# Patient Record
Sex: MALE | Race: WHITE | NOT HISPANIC OR LATINO | Employment: UNEMPLOYED | URBAN - METROPOLITAN AREA
[De-identification: names, ages, dates, MRNs, and addresses within clinical notes are randomized per-mention and may not be internally consistent; named-entity substitution may affect disease eponyms.]

---

## 2019-02-05 ENCOUNTER — HOSPITAL ENCOUNTER (EMERGENCY)
Facility: HOSPITAL | Age: 29
Discharge: HOME | End: 2019-02-05
Attending: EMERGENCY MEDICINE

## 2019-02-05 VITALS
BODY MASS INDEX: 23.02 KG/M2 | WEIGHT: 151.88 LBS | OXYGEN SATURATION: 99 % | HEART RATE: 128 BPM | HEIGHT: 68 IN | RESPIRATION RATE: 16 BRPM | TEMPERATURE: 99 F | DIASTOLIC BLOOD PRESSURE: 87 MMHG | SYSTOLIC BLOOD PRESSURE: 134 MMHG

## 2019-02-05 DIAGNOSIS — R19.7 VOMITING AND DIARRHEA: Primary | ICD-10-CM

## 2019-02-05 DIAGNOSIS — R10.32 LLQ ABDOMINAL PAIN: ICD-10-CM

## 2019-02-05 DIAGNOSIS — R11.10 VOMITING AND DIARRHEA: Primary | ICD-10-CM

## 2019-02-05 PROCEDURE — 99283 EMERGENCY DEPT VISIT LOW MDM: CPT

## 2019-02-05 RX ORDER — ONDANSETRON 4 MG/1
4 TABLET, ORALLY DISINTEGRATING ORAL EVERY 8 HOURS PRN
Qty: 12 TABLET | Refills: 0 | Status: ON HOLD | OUTPATIENT
Start: 2019-02-05 | End: 2020-10-09 | Stop reason: HOSPADM

## 2019-02-05 NOTE — ED PROVIDER NOTES
"Encounter Date: 2/5/2019    SCRIBE #1 NOTE: I, Kaitlynn Carrasco, am scribing for, and in the presence of, Xavier Burton MD.       History     Chief Complaint   Patient presents with    Diarrhea     cough       Time seen by provider: 7:27 AM on 02/05/2019    Jabier Haider Jr. is a 28 y.o. male who presents to the ED with an onset of nausea, vomiting, and diarrhea. He began to sweat and felt feverish but did not take his temperature 2 nights ago and endorsed watery stool. The patient woke up vomiting yesterday morning and stayed home from work. Upon waking up this morning, he has endorsed an intermittent "hacking" cough. 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 He denies taking daily medications, having an established PCP, sick contacts, abdominal pain, blood in stool or urine, difficulty urinating, testicular pain, or any other symptoms at this time. No abdominal SHx noted. No known drug allergies noted.      The history is provided by the patient.     Review of patient's allergies indicates:   Allergen Reactions    Latex, natural rubber      History reviewed. No pertinent past medical history.  Past Surgical History:   Procedure Laterality Date    DENTAL SURGERY      ear      tubes     History reviewed. No pertinent family history.  Social History     Tobacco Use    Smoking status: Current Every Day Smoker     Packs/day: 1.00   Substance Use Topics    Alcohol use: No    Drug use: Not on file     Review of Systems   Constitutional: Positive for diaphoresis and fever (subjective).   HENT: Negative for nosebleeds.    Eyes: Negative for visual disturbance.   Respiratory: Positive for cough (productive, intermittent).    Cardiovascular: Negative for chest pain and palpitations.   Gastrointestinal: Positive for diarrhea, nausea and vomiting. Negative for " abdominal pain and blood in stool.   Genitourinary: Negative for difficulty urinating, hematuria and testicular pain.   Musculoskeletal: Negative for back pain and neck pain.   Skin: Negative for rash.   Neurological: Negative for seizures, syncope and headaches.     Physical Exam     Initial Vitals [02/05/19 0722]   BP Pulse Resp Temp SpO2   134/87 (!) 128 16 98.6 °F (37 °C) 99 %      MAP       --         Physical Exam    Nursing note and vitals reviewed.  Constitutional: He appears well-developed and well-nourished. He is not diaphoretic. No distress.   HENT:   Head: Normocephalic and atraumatic.   Mouth/Throat: Oropharynx is clear and moist and mucous membranes are normal.   Mucous membranes are moist.    Eyes: Conjunctivae are normal. No scleral icterus.   Neck: Neck supple.   Cardiovascular: Regular rhythm, normal heart sounds and intact distal pulses. Tachycardia present.  Exam reveals no gallop and no friction rub.    No murmur heard.  Pulmonary/Chest: Breath sounds normal. He has no wheezes. He has no rhonchi. He has no rales.   Abdominal: Soft. He exhibits no distension and no mass. There is tenderness in the left lower quadrant. There is guarding. There is no rebound. No hernia.   Focal LLQ tenderness with moderate voluntary guarding. No involuntary guarding.    Musculoskeletal: He exhibits no edema.   No peripheral edema.    Neurological: He is alert and oriented to person, place, and time.   Normal gait.   Skin: Capillary refill takes less than 2 seconds. No rash noted. No erythema.   Psychiatric: He has a normal mood and affect. Thought content normal.       ED Course   Procedures  Labs Reviewed - No data to display     Imaging Results    None          Medical Decision Making:   History:   Old Medical Records: I decided to obtain old medical records.            Scribe Attestation:   Scribe #1: I performed the above scribed service and the documentation accurately describes the services I performed. I  "attest to the accuracy of the note.    I, Dr. Xavier Burton, personally performed the services described in this documentation. All medical record entries made by the scribe were at my direction and in my presence.  I have reviewed the chart and agree that the record reflects my personal performance and is accurate and complete. Xavier Burton MD.  8:47 AM 02/05/2019    Jabier Haider Jr. is a 28 y.o. male presenting with several days of vomiting and nonbloody diarrhea in the setting of tachycardia noted here as well as left lower quadrant abdominal pain on palpation.  He denies significant subjective abdominal pain. I did explain the issues with possible emergent intra-abdominal process such as abscess, colitis, diverticulitis, obstruction.  I did recommend further laboratories in view of noted features as well as imaging.  I discussed the possibility of worsening and possibly life-threatening process leading to death or permanent disability.  Patient is not altered and is able to repeat important features back in his own words.  He has the opportunity to ask questions.  He understands he may return at any point and is recommended close outpatient follow-up for reassessment.  Work note provided as requested since viral infectious process is possible.  Low suspicion for bacterial infectious process based on available information.  Ondansetron as necessary for nausea prescribed with oral rehydration reviewed for home.  Detailed return precautions reviewed as well. Patient advised to have friends or family observe at home pending follow-up.  He is discharged against medical advice as he wishes no additional testing or treatment citing "insurance issues."             Clinical Impression:   The primary encounter diagnosis was Vomiting and diarrhea. A diagnosis of LLQ abdominal pain was also pertinent to this visit.      Disposition:   Disposition: ILDEFONSO Burton, " MD  02/05/19 0850

## 2019-02-05 NOTE — LETTER
Patient: Jabier Haider  YOB: 1990  Date: 2/5/2019 Time: 7:42 AM  Location: [facility]    Leaving the [facility] Against Medical Advice    Chart #:[id number]    This will certify that I, the undersigned,    ______________________________________________________________________    A patient in the above named medical center, having requested discharge and removal from the medical [place] against the advice of my attending physician(s), hereby release [facility], its physicians, officers and employees, severally and individually, from any and all liability of any nature whatsoever for any injury or harm or complication of any kind that may result directly or indirectly, by reason of my terminating my stay as a patient at [facility] and my departure from [facility], and hereby waive any and all rights of action I may now have or later acquire as a result of my voluntary departure from [facility] and the termination of my stay as a patient therein.    This release is made with the full knowledge of the danger that may result from the action which I am taking.      Date:_______________________                         ___________________________                                                                                    Patient/Legal Representative    Witness:        ____________________________                          ___________________________  Nurse                                                                        Physician

## 2020-01-23 ENCOUNTER — HOSPITAL ENCOUNTER (EMERGENCY)
Facility: HOSPITAL | Age: 30
Discharge: HOME | End: 2020-01-23
Attending: EMERGENCY MEDICINE

## 2020-01-23 VITALS
OXYGEN SATURATION: 100 % | HEART RATE: 102 BPM | BODY MASS INDEX: 21.98 KG/M2 | WEIGHT: 145 LBS | HEIGHT: 68 IN | DIASTOLIC BLOOD PRESSURE: 88 MMHG | SYSTOLIC BLOOD PRESSURE: 148 MMHG | TEMPERATURE: 97 F

## 2020-01-23 DIAGNOSIS — R07.89 ATYPICAL CHEST PAIN: Primary | ICD-10-CM

## 2020-01-23 DIAGNOSIS — R07.9 CHEST PAIN: ICD-10-CM

## 2020-01-23 LAB
ALBUMIN SERPL BCP-MCNC: 4.8 G/DL (ref 3.5–5.2)
ALP SERPL-CCNC: 77 U/L (ref 55–135)
ALT SERPL W/O P-5'-P-CCNC: 15 U/L (ref 10–44)
ANION GAP SERPL CALC-SCNC: 11 MMOL/L (ref 8–16)
AST SERPL-CCNC: 18 U/L (ref 10–40)
BASOPHILS # BLD AUTO: 0.04 K/UL (ref 0–0.2)
BASOPHILS NFR BLD: 0.4 % (ref 0–1.9)
BILIRUB SERPL-MCNC: 0.5 MG/DL (ref 0.1–1)
BNP SERPL-MCNC: <10 PG/ML (ref 0–99)
BUN SERPL-MCNC: 15 MG/DL (ref 6–20)
CALCIUM SERPL-MCNC: 9.7 MG/DL (ref 8.7–10.5)
CHLORIDE SERPL-SCNC: 105 MMOL/L (ref 95–110)
CO2 SERPL-SCNC: 27 MMOL/L (ref 23–29)
CREAT SERPL-MCNC: 0.9 MG/DL (ref 0.5–1.4)
DIFFERENTIAL METHOD: ABNORMAL
EOSINOPHIL # BLD AUTO: 0.1 K/UL (ref 0–0.5)
EOSINOPHIL NFR BLD: 1.1 % (ref 0–8)
ERYTHROCYTE [DISTWIDTH] IN BLOOD BY AUTOMATED COUNT: 11.9 % (ref 11.5–14.5)
EST. GFR  (AFRICAN AMERICAN): >60 ML/MIN/1.73 M^2
EST. GFR  (NON AFRICAN AMERICAN): >60 ML/MIN/1.73 M^2
GLUCOSE SERPL-MCNC: 102 MG/DL (ref 70–110)
HCT VFR BLD AUTO: 45.2 % (ref 40–54)
HGB BLD-MCNC: 15.6 G/DL (ref 14–18)
IMM GRANULOCYTES # BLD AUTO: 0.02 K/UL (ref 0–0.04)
LYMPHOCYTES # BLD AUTO: 2.8 K/UL (ref 1–4.8)
LYMPHOCYTES NFR BLD: 28.5 % (ref 18–48)
MCH RBC QN AUTO: 32.2 PG (ref 27–31)
MCHC RBC AUTO-ENTMCNC: 34.5 G/DL (ref 32–36)
MCV RBC AUTO: 93 FL (ref 82–98)
MONOCYTES # BLD AUTO: 0.6 K/UL (ref 0.3–1)
MONOCYTES NFR BLD: 6.4 % (ref 4–15)
NEUTROPHILS # BLD AUTO: 6.3 K/UL (ref 1.8–7.7)
NEUTROPHILS NFR BLD: 63.4 % (ref 38–73)
NRBC BLD-RTO: 0 /100 WBC
PLATELET # BLD AUTO: 234 K/UL (ref 150–350)
PMV BLD AUTO: 8.8 FL (ref 9.2–12.9)
POTASSIUM SERPL-SCNC: 3.7 MMOL/L (ref 3.5–5.1)
PROT SERPL-MCNC: 7.6 G/DL (ref 6–8.4)
RBC # BLD AUTO: 4.85 M/UL (ref 4.6–6.2)
SODIUM SERPL-SCNC: 143 MMOL/L (ref 136–145)
TROPONIN I SERPL DL<=0.01 NG/ML-MCNC: 0.01 NG/ML (ref 0–0.03)
WBC # BLD AUTO: 9.92 K/UL (ref 3.9–12.7)

## 2020-01-23 PROCEDURE — 63600175 PHARM REV CODE 636 W HCPCS: Performed by: EMERGENCY MEDICINE

## 2020-01-23 PROCEDURE — 36415 COLL VENOUS BLD VENIPUNCTURE: CPT

## 2020-01-23 PROCEDURE — 96372 THER/PROPH/DIAG INJ SC/IM: CPT

## 2020-01-23 PROCEDURE — 83880 ASSAY OF NATRIURETIC PEPTIDE: CPT

## 2020-01-23 PROCEDURE — 80053 COMPREHEN METABOLIC PANEL: CPT

## 2020-01-23 PROCEDURE — 85025 COMPLETE CBC W/AUTO DIFF WBC: CPT

## 2020-01-23 PROCEDURE — 84484 ASSAY OF TROPONIN QUANT: CPT

## 2020-01-23 PROCEDURE — 93005 ELECTROCARDIOGRAM TRACING: CPT

## 2020-01-23 PROCEDURE — 99285 EMERGENCY DEPT VISIT HI MDM: CPT | Mod: 25

## 2020-01-23 RX ORDER — DICLOFENAC SODIUM 50 MG/1
50 TABLET, DELAYED RELEASE ORAL 3 TIMES DAILY PRN
Qty: 15 TABLET | Refills: 0 | Status: ON HOLD | OUTPATIENT
Start: 2020-01-23 | End: 2020-10-09 | Stop reason: HOSPADM

## 2020-01-23 RX ORDER — KETOROLAC TROMETHAMINE 30 MG/ML
10 INJECTION, SOLUTION INTRAMUSCULAR; INTRAVENOUS
Status: COMPLETED | OUTPATIENT
Start: 2020-01-23 | End: 2020-01-23

## 2020-01-23 RX ADMIN — KETOROLAC TROMETHAMINE 10 MG: 30 INJECTION, SOLUTION INTRAMUSCULAR at 11:01

## 2020-01-24 NOTE — ED PROVIDER NOTES
Encounter Date: 1/23/2020    SCRIBE #1 NOTE: IRip am scribing for, and in the presence of, Jacoby Young MD.       History     Chief Complaint   Patient presents with    Chest Pain     intermittent midsternal chest pain x 2-3 days        Time seen by provider: 9:19 PM on 01/23/2020    Jabier Haider Jr. is a 29 y.o. male who presents to the ED with an onset of intermittent middle and right-sided chest pain lasting for two to three days and worsening since this morning. He reports that his pain radiates downward and to the left. Pt also describes the pain as worsening at work. He denies any other sx at this time, including cough, shortness or breath, or bloody stool. No cardiopulmonary PMHx or PSHx. SHx of active work, sheeting for bulkheads. NKDA.    The history is provided by the patient.     Review of patient's allergies indicates:   Allergen Reactions    Latex, natural rubber      No past medical history on file.  Past Surgical History:   Procedure Laterality Date    DENTAL SURGERY      ear      tubes     History reviewed. No pertinent family history.  Social History     Tobacco Use    Smoking status: Current Every Day Smoker     Packs/day: 1.00   Substance Use Topics    Alcohol use: No    Drug use: Not on file     Review of Systems   Constitutional: Negative for fever.   HENT: Negative for sore throat.    Eyes: Negative for visual disturbance.   Respiratory: Negative for cough and shortness of breath.    Cardiovascular: Positive for chest pain.   Gastrointestinal: Negative for blood in stool and nausea.   Genitourinary: Negative for dysuria.   Musculoskeletal: Negative for back pain.   Skin: Negative for rash.   Neurological: Negative for weakness.       Physical Exam     Initial Vitals [01/23/20 2109]   BP Pulse Resp Temp SpO2   (!) 148/88 102 -- 97.1 °F (36.2 °C) 99 %      MAP       --         Physical Exam    Nursing note and vitals reviewed.  Constitutional: He appears  well-developed and well-nourished. He is not diaphoretic. No distress.   HENT:   Head: Normocephalic and atraumatic.   Eyes: EOM are normal. Pupils are equal, round, and reactive to light.   Neck: Normal range of motion. Neck supple.   Cardiovascular: Normal rate, regular rhythm, normal heart sounds and intact distal pulses. Exam reveals no gallop and no friction rub.    No murmur heard.  Regular rate and rhythm. Heart sounds normal without rubs, gallops, or murmurs.   Pulmonary/Chest: Breath sounds normal. No respiratory distress. He has no wheezes. He has no rhonchi. He has no rales.   Abdominal: Soft. Bowel sounds are normal. There is no tenderness. There is no rebound and no guarding.   Musculoskeletal: Normal range of motion.   Neurological: He is alert and oriented to person, place, and time.   Skin: Skin is warm.   Psychiatric: He has a normal mood and affect. His behavior is normal. Judgment and thought content normal.         ED Course   Procedures  Labs Reviewed   CBC W/ AUTO DIFFERENTIAL - Abnormal; Notable for the following components:       Result Value    Mean Corpuscular Hemoglobin 32.2 (*)     MPV 8.8 (*)     All other components within normal limits   COMPREHENSIVE METABOLIC PANEL   B-TYPE NATRIURETIC PEPTIDE   TROPONIN I     EKG Readings: (Independently Interpreted)   Sinus rhythm at rate of 95 bpm with normal axis and normal intervals. No acute ST segment changes.        Imaging Results          X-Ray Chest PA And Lateral (Final result)  Result time 01/23/20 21:45:32    Final result by Sebas Foreman MD (01/23/20 21:45:32)                 Impression:      No acute process.      Electronically signed by: Sebas Foreman MD  Date:    01/23/2020  Time:    21:45             Narrative:    EXAMINATION:  XR CHEST PA AND LATERAL    CLINICAL HISTORY:  Chest pain, unspecified    TECHNIQUE:  PA and lateral views of the chest were performed.    COMPARISON:  None    FINDINGS:  The trachea is unremarkable.  The  cardiomediastinal silhouette is within normal limits.  The hilar structures are unremarkable.  The hemidiaphragms are within normal limits.  There is no evidence of free air beneath the hemidiaphragms.  There are no pleural effusions.  There is no evidence of a pneumothorax.  There is no evidence of pneumomediastinum.  No airspace opacity is present.  The osseous structures are unremarkable.  The subcutaneous tissues are within normal limits.                                 Medical Decision Making:   Initial Assessment:   29-year-old male presented with chest pain.  Differential Diagnosis:   Initial differential diagnosis included but not limited to cardiac arrhythmia, chest wall pain, and atypical MI.  Clinical Tests:   Lab Tests: Ordered and Reviewed  Radiological Study: Ordered and Reviewed  Medical Tests: Ordered and Reviewed  ED Management:  The patient was emergently evaluated in the emergency department, his evaluation was significant for a young male with a normal lung exam.  The patient's EKG showed no acute abnormalities per my independent interpretation.  The patient's chest x-ray and labs showed no acute abnormalities, including a negative troponin.  The patient likely has chest wall pain.  He was treated with parental Toradol here in the emergency department.  He is stable for discharge to home.  He will be discharged home with p.o. diclofenac and he is to otherwise follow up with his PCP for further care.            Scribe Attestation:   Scribe #1: I performed the above scribed service and the documentation accurately describes the services I performed. I attest to the accuracy of the note.               I, Dr. Jacoby Young, personally performed the services described in this documentation. All medical record entries made by the scribe were at my direction and in my presence.  I have reviewed the chart and agree that the record reflects my personal performance and is accurate and complete. Jacoby  MD Hector.  12:00 AM 01/24/2020             Clinical Impression:       ICD-10-CM ICD-9-CM   1. Atypical chest pain R07.89 786.59   2. Chest pain R07.9 786.50         Disposition:   Disposition: Discharged  Condition: Stable                     Jacoby Young MD  01/24/20 0001

## 2020-01-24 NOTE — ED NOTES
Pt states he is stepping out to get snack- advised he needs to remain npo until we get test results back.

## 2020-10-04 PROCEDURE — 90833 PSYTX W PT W E/M 30 MIN: CPT | Mod: ,,, | Performed by: STUDENT IN AN ORGANIZED HEALTH CARE EDUCATION/TRAINING PROGRAM

## 2020-10-04 PROCEDURE — 90833 PR PSYCHOTHERAPY W/PATIENT W/E&M, 30 MIN (ADD ON): ICD-10-PCS | Mod: ,,, | Performed by: STUDENT IN AN ORGANIZED HEALTH CARE EDUCATION/TRAINING PROGRAM

## 2020-10-05 PROCEDURE — 99285 EMERGENCY DEPT VISIT HI MDM: CPT

## 2020-10-06 ENCOUNTER — HOSPITAL ENCOUNTER (EMERGENCY)
Facility: HOSPITAL | Age: 30
Discharge: PSYCHIATRIC OR SUBSTANCE USE FACILITY | End: 2020-10-06
Attending: EMERGENCY MEDICINE
Payer: OTHER GOVERNMENT

## 2020-10-06 ENCOUNTER — HOSPITAL ENCOUNTER (INPATIENT)
Facility: HOSPITAL | Age: 30
LOS: 3 days | Discharge: HOME | DRG: 885 | End: 2020-10-09
Attending: PSYCHIATRY & NEUROLOGY | Admitting: PSYCHIATRY & NEUROLOGY

## 2020-10-06 VITALS
RESPIRATION RATE: 16 BRPM | HEIGHT: 68 IN | BODY MASS INDEX: 21.98 KG/M2 | SYSTOLIC BLOOD PRESSURE: 109 MMHG | OXYGEN SATURATION: 99 % | DIASTOLIC BLOOD PRESSURE: 59 MMHG | TEMPERATURE: 98 F | HEART RATE: 76 BPM | WEIGHT: 145 LBS

## 2020-10-06 DIAGNOSIS — F32.A DEPRESSION WITH SUICIDAL IDEATION: ICD-10-CM

## 2020-10-06 DIAGNOSIS — R45.851 DEPRESSION WITH SUICIDAL IDEATION: ICD-10-CM

## 2020-10-06 DIAGNOSIS — Z00.8 MEDICAL CLEARANCE FOR PSYCHIATRIC ADMISSION: ICD-10-CM

## 2020-10-06 DIAGNOSIS — F41.1 GAD (GENERALIZED ANXIETY DISORDER): ICD-10-CM

## 2020-10-06 DIAGNOSIS — R45.851 SUICIDAL IDEATION: Primary | ICD-10-CM

## 2020-10-06 DIAGNOSIS — F19.10 POLYSUBSTANCE ABUSE: ICD-10-CM

## 2020-10-06 DIAGNOSIS — F32.A DEPRESSION: ICD-10-CM

## 2020-10-06 DIAGNOSIS — R73.09 ELEVATED GLUCOSE: ICD-10-CM

## 2020-10-06 LAB
ALBUMIN SERPL BCP-MCNC: 4.8 G/DL (ref 3.5–5.2)
ALP SERPL-CCNC: 59 U/L (ref 55–135)
ALT SERPL W/O P-5'-P-CCNC: 17 U/L (ref 10–44)
AMPHET+METHAMPHET UR QL: NORMAL
ANION GAP SERPL CALC-SCNC: 13 MMOL/L (ref 8–16)
APAP SERPL-MCNC: <10 UG/ML (ref 10–20)
AST SERPL-CCNC: 20 U/L (ref 10–40)
BACTERIA #/AREA URNS HPF: NEGATIVE /HPF
BARBITURATES UR QL SCN>200 NG/ML: NEGATIVE
BASOPHILS # BLD AUTO: 0.04 K/UL (ref 0–0.2)
BASOPHILS NFR BLD: 0.4 % (ref 0–1.9)
BENZODIAZ UR QL SCN>200 NG/ML: NEGATIVE
BILIRUB SERPL-MCNC: 0.7 MG/DL (ref 0.1–1)
BILIRUB UR QL STRIP: NEGATIVE
BUN SERPL-MCNC: 20 MG/DL (ref 6–20)
BZE UR QL SCN: NEGATIVE
CALCIUM SERPL-MCNC: 9.4 MG/DL (ref 8.7–10.5)
CANNABINOIDS UR QL SCN: NORMAL
CHLORIDE SERPL-SCNC: 101 MMOL/L (ref 95–110)
CLARITY UR: CLEAR
CO2 SERPL-SCNC: 25 MMOL/L (ref 23–29)
COLOR UR: YELLOW
CREAT SERPL-MCNC: 1 MG/DL (ref 0.5–1.4)
CREAT UR-MCNC: 242 MG/DL (ref 23–375)
DIFFERENTIAL METHOD: NORMAL
EOSINOPHIL # BLD AUTO: 0.2 K/UL (ref 0–0.5)
EOSINOPHIL NFR BLD: 1.6 % (ref 0–8)
ERYTHROCYTE [DISTWIDTH] IN BLOOD BY AUTOMATED COUNT: 11.8 % (ref 11.5–14.5)
EST. GFR  (AFRICAN AMERICAN): >60 ML/MIN/1.73 M^2
EST. GFR  (NON AFRICAN AMERICAN): >60 ML/MIN/1.73 M^2
ETHANOL SERPL-MCNC: <5 MG/DL
GLUCOSE SERPL-MCNC: 147 MG/DL (ref 70–110)
GLUCOSE UR QL STRIP: NEGATIVE
HCT VFR BLD AUTO: 42.6 % (ref 40–54)
HGB BLD-MCNC: 14.7 G/DL (ref 14–18)
HGB UR QL STRIP: NEGATIVE
HYALINE CASTS #/AREA URNS LPF: 12 /LPF
IMM GRANULOCYTES # BLD AUTO: 0.02 K/UL (ref 0–0.04)
IMM GRANULOCYTES NFR BLD AUTO: 0.2 % (ref 0–0.5)
KETONES UR QL STRIP: NEGATIVE
LEUKOCYTE ESTERASE UR QL STRIP: NEGATIVE
LYMPHOCYTES # BLD AUTO: 2.5 K/UL (ref 1–4.8)
LYMPHOCYTES NFR BLD: 27 % (ref 18–48)
MCH RBC QN AUTO: 30.7 PG (ref 27–31)
MCHC RBC AUTO-ENTMCNC: 34.5 G/DL (ref 32–36)
MCV RBC AUTO: 89 FL (ref 82–98)
MICROSCOPIC COMMENT: ABNORMAL
MONOCYTES # BLD AUTO: 0.8 K/UL (ref 0.3–1)
MONOCYTES NFR BLD: 8.8 % (ref 4–15)
NEUTROPHILS # BLD AUTO: 5.6 K/UL (ref 1.8–7.7)
NEUTROPHILS NFR BLD: 62 % (ref 38–73)
NITRITE UR QL STRIP: NEGATIVE
NRBC BLD-RTO: 0 /100 WBC
OPIATES UR QL SCN: NORMAL
PCP UR QL SCN>25 NG/ML: NEGATIVE
PH UR STRIP: 6 [PH] (ref 5–8)
PLATELET # BLD AUTO: 256 K/UL (ref 150–350)
PMV BLD AUTO: 9.2 FL (ref 9.2–12.9)
POTASSIUM SERPL-SCNC: 3.6 MMOL/L (ref 3.5–5.1)
PROT SERPL-MCNC: 7.4 G/DL (ref 6–8.4)
PROT UR QL STRIP: ABNORMAL
RBC # BLD AUTO: 4.79 M/UL (ref 4.6–6.2)
RBC #/AREA URNS HPF: 1 /HPF (ref 0–4)
SALICYLATES SERPL-MCNC: <4 MG/DL (ref 15–30)
SARS-COV-2 RDRP RESP QL NAA+PROBE: NEGATIVE
SODIUM SERPL-SCNC: 139 MMOL/L (ref 136–145)
SP GR UR STRIP: 1.02 (ref 1–1.03)
SQUAMOUS #/AREA URNS HPF: 1 /HPF
TOXICOLOGY INFORMATION: NORMAL
URN SPEC COLLECT METH UR: ABNORMAL
UROBILINOGEN UR STRIP-ACNC: ABNORMAL EU/DL
WBC # BLD AUTO: 9.1 K/UL (ref 3.9–12.7)
WBC #/AREA URNS HPF: 1 /HPF (ref 0–5)

## 2020-10-06 PROCEDURE — 25000003 PHARM REV CODE 250: Performed by: PSYCHIATRY & NEUROLOGY

## 2020-10-06 PROCEDURE — 80320 DRUG SCREEN QUANTALCOHOLS: CPT

## 2020-10-06 PROCEDURE — 90833 PR PSYCHOTHERAPY W/PATIENT W/E&M, 30 MIN (ADD ON): ICD-10-PCS | Mod: ,,, | Performed by: STUDENT IN AN ORGANIZED HEALTH CARE EDUCATION/TRAINING PROGRAM

## 2020-10-06 PROCEDURE — 81001 URINALYSIS AUTO W/SCOPE: CPT | Mod: 59

## 2020-10-06 PROCEDURE — 80053 COMPREHEN METABOLIC PANEL: CPT

## 2020-10-06 PROCEDURE — 11400000 HC PSYCH PRIVATE ROOM

## 2020-10-06 PROCEDURE — 85025 COMPLETE CBC W/AUTO DIFF WBC: CPT

## 2020-10-06 PROCEDURE — 25000003 PHARM REV CODE 250: Performed by: STUDENT IN AN ORGANIZED HEALTH CARE EDUCATION/TRAINING PROGRAM

## 2020-10-06 PROCEDURE — 99223 1ST HOSP IP/OBS HIGH 75: CPT | Mod: ,,, | Performed by: STUDENT IN AN ORGANIZED HEALTH CARE EDUCATION/TRAINING PROGRAM

## 2020-10-06 PROCEDURE — 80307 DRUG TEST PRSMV CHEM ANLYZR: CPT | Mod: 59

## 2020-10-06 PROCEDURE — 90833 PSYTX W PT W E/M 30 MIN: CPT | Mod: ,,, | Performed by: STUDENT IN AN ORGANIZED HEALTH CARE EDUCATION/TRAINING PROGRAM

## 2020-10-06 PROCEDURE — U0002 COVID-19 LAB TEST NON-CDC: HCPCS

## 2020-10-06 PROCEDURE — 99223 PR INITIAL HOSPITAL CARE,LEVL III: ICD-10-PCS | Mod: ,,, | Performed by: STUDENT IN AN ORGANIZED HEALTH CARE EDUCATION/TRAINING PROGRAM

## 2020-10-06 PROCEDURE — S4991 NICOTINE PATCH NONLEGEND: HCPCS | Performed by: PSYCHIATRY & NEUROLOGY

## 2020-10-06 PROCEDURE — 80307 DRUG TEST PRSMV CHEM ANLYZR: CPT

## 2020-10-06 RX ORDER — OLANZAPINE 10 MG/1
10 TABLET ORAL EVERY 6 HOURS PRN
Status: DISCONTINUED | OUTPATIENT
Start: 2020-10-06 | End: 2020-10-06

## 2020-10-06 RX ORDER — OLANZAPINE 10 MG/1
10 TABLET ORAL EVERY 4 HOURS PRN
Status: DISCONTINUED | OUTPATIENT
Start: 2020-10-06 | End: 2020-10-09 | Stop reason: HOSPADM

## 2020-10-06 RX ORDER — POLYVINYL ALCOHOL 14 MG/ML
1 SOLUTION/ DROPS OPHTHALMIC
Status: DISCONTINUED | OUTPATIENT
Start: 2020-10-06 | End: 2020-10-09 | Stop reason: HOSPADM

## 2020-10-06 RX ORDER — MAG HYDROX/ALUMINUM HYD/SIMETH 200-200-20
30 SUSPENSION, ORAL (FINAL DOSE FORM) ORAL EVERY 6 HOURS PRN
Status: DISCONTINUED | OUTPATIENT
Start: 2020-10-06 | End: 2020-10-09 | Stop reason: HOSPADM

## 2020-10-06 RX ORDER — LOPERAMIDE HYDROCHLORIDE 2 MG/1
2 CAPSULE ORAL
Status: DISCONTINUED | OUTPATIENT
Start: 2020-10-06 | End: 2020-10-09 | Stop reason: HOSPADM

## 2020-10-06 RX ORDER — ESCITALOPRAM OXALATE 5 MG/1
5 TABLET ORAL DAILY
Status: DISCONTINUED | OUTPATIENT
Start: 2020-10-06 | End: 2020-10-07

## 2020-10-06 RX ORDER — MUPIROCIN 20 MG/G
OINTMENT TOPICAL 2 TIMES DAILY
Status: DISCONTINUED | OUTPATIENT
Start: 2020-10-06 | End: 2020-10-09 | Stop reason: HOSPADM

## 2020-10-06 RX ORDER — HYDROXYZINE PAMOATE 50 MG/1
50 CAPSULE ORAL EVERY 6 HOURS PRN
Status: DISCONTINUED | OUTPATIENT
Start: 2020-10-06 | End: 2020-10-06

## 2020-10-06 RX ORDER — IBUPROFEN 200 MG
1 TABLET ORAL DAILY
Status: DISCONTINUED | OUTPATIENT
Start: 2020-10-06 | End: 2020-10-09 | Stop reason: HOSPADM

## 2020-10-06 RX ORDER — HYDROXYZINE PAMOATE 50 MG/1
50 CAPSULE ORAL EVERY 6 HOURS PRN
Status: DISCONTINUED | OUTPATIENT
Start: 2020-10-06 | End: 2020-10-09 | Stop reason: HOSPADM

## 2020-10-06 RX ORDER — IBUPROFEN 200 MG
1 TABLET ORAL DAILY PRN
Status: DISCONTINUED | OUTPATIENT
Start: 2020-10-06 | End: 2020-10-06

## 2020-10-06 RX ORDER — FOLIC ACID 1 MG/1
1 TABLET ORAL DAILY
Status: DISCONTINUED | OUTPATIENT
Start: 2020-10-06 | End: 2020-10-09 | Stop reason: HOSPADM

## 2020-10-06 RX ORDER — OLANZAPINE 10 MG/2ML
10 INJECTION, POWDER, FOR SOLUTION INTRAMUSCULAR EVERY 4 HOURS PRN
Status: DISCONTINUED | OUTPATIENT
Start: 2020-10-06 | End: 2020-10-09 | Stop reason: HOSPADM

## 2020-10-06 RX ORDER — IBUPROFEN 600 MG/1
600 TABLET ORAL EVERY 6 HOURS PRN
Status: DISCONTINUED | OUTPATIENT
Start: 2020-10-06 | End: 2020-10-06

## 2020-10-06 RX ORDER — DOCUSATE SODIUM 100 MG/1
100 CAPSULE, LIQUID FILLED ORAL DAILY PRN
Status: DISCONTINUED | OUTPATIENT
Start: 2020-10-06 | End: 2020-10-09 | Stop reason: HOSPADM

## 2020-10-06 RX ORDER — ACETAMINOPHEN 325 MG/1
650 TABLET ORAL EVERY 6 HOURS PRN
Status: DISCONTINUED | OUTPATIENT
Start: 2020-10-06 | End: 2020-10-09 | Stop reason: HOSPADM

## 2020-10-06 RX ORDER — OLANZAPINE 10 MG/2ML
10 INJECTION, POWDER, FOR SOLUTION INTRAMUSCULAR EVERY 6 HOURS PRN
Status: DISCONTINUED | OUTPATIENT
Start: 2020-10-06 | End: 2020-10-06

## 2020-10-06 RX ADMIN — IBUPROFEN 600 MG: 600 TABLET, FILM COATED ORAL at 09:10

## 2020-10-06 RX ADMIN — HYDROXYZINE PAMOATE 50 MG: 50 CAPSULE ORAL at 08:10

## 2020-10-06 RX ADMIN — MUPIROCIN: 20 OINTMENT TOPICAL at 08:10

## 2020-10-06 RX ADMIN — OLANZAPINE 10 MG: 10 TABLET, FILM COATED ORAL at 09:10

## 2020-10-06 RX ADMIN — ACETAMINOPHEN 650 MG: 325 TABLET ORAL at 04:10

## 2020-10-06 RX ADMIN — ESCITALOPRAM OXALATE 5 MG: 5 TABLET, FILM COATED ORAL at 01:10

## 2020-10-06 RX ADMIN — ACETAMINOPHEN 650 MG: 325 TABLET ORAL at 08:10

## 2020-10-06 RX ADMIN — OLANZAPINE 10 MG: 10 TABLET, FILM COATED ORAL at 04:10

## 2020-10-06 RX ADMIN — POLYVINYL ALCOHOL 1 DROP: 14 SOLUTION/ DROPS OPHTHALMIC at 04:10

## 2020-10-06 RX ADMIN — Medication 1 PATCH: at 03:10
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
## 2020-10-06 NOTE — CONSULTS
"  [facility]  Adult Nutrition  Consult Note    SUMMARY     Recommendations    Recommendation:   1. Encourage pt to increase intake of meals as appropriate   2. regular diet is appropriate    Interventions:  General Healthful Diet    Goals: consume at least 50-75% of meals by follow-up  Nutrition Goal Status: new  Communication of RD Recs: (POC)    Reason for Assessment    Reason For Assessment: consult  Diagnosis: psychological disorder  Relevant Medical History: No past medical history on file.    General Information Comments: Pt consumed 25% of breakfast this AM, no other intake recorded at this time. Insignificant weight loss observed over the past 9 months. NFPE not completed per psych status.     Nutrition Discharge Planning: regular diet    Nutrition Risk Screen    Nutrition Risk Screen: no indicators present    Nutrition/Diet History    Food Allergies: NKFA    Anthropometrics    Temp: 97.9 °F (36.6 °C)  Height Method: Measured  Height: 5' 8" (172.7 cm)  Height (inches): 68 in  Weight Method: Standard Scale  Weight: 62 kg (136 lb 11 oz)  Weight (lb): 136.69 lb  Ideal Body Weight (IBW), Male: 154 lb  % Ideal Body Weight, Male (lb): 88.76 %  BMI (Calculated): 20.8  BMI Grade: 18.5-24.9 - normal       Lab/Procedures/Meds    Pertinent Labs Reviewed: reviewed  Pertinent Labs Comments: glucose 147  Pertinent Medications Reviewed: reviewed  Pertinent Medications Comments: folic acid, MVI    Estimated/Assessed Needs    Weight Used For Calorie Calculations: 62 kg (136 lb 11 oz)  Energy Calorie Requirements (kcal): 2200  Energy Need Method: [place]-St Jeor(*1.4)  Protein Requirements: 49-62 g(.8-1 g/kg)  Weight Used For Protein Calculations: 62 kg (136 lb 11 oz)     Estimated Fluid Requirement Method: RDA Method  RDA Method (mL): 2200     Nutrition Prescription Ordered    Current Diet Order: Regular Diet    Evaluation of Received Nutrient/Fluid Intake    Fluid Required: meeting needs  % Intake of " Estimated Energy Needs: 25 - 50 %  % Meal Intake: 25 - 50 %    Nutrition Risk    Level of Risk/Frequency of Follow-up: low - moderate     Assessment and Plan  Nutrition Problem:  Inadequate energy intake    Related to (etiology):   psychosis    Signs and Symptoms (as evidenced by):   25% intake of breakfast this AM    Interventions:  General Healthful Diet    Nutrition Diagnosis Status:   New      Monitor and Evaluation    Food and Nutrient Intake: energy intake, food and beverage intake  Food and Nutrient Adminstration: diet order  Anthropometric Measurements: weight change  Biochemical Data, Medical Tests and Procedures: glucose/endocrine profile  Nutrition-Focused Physical Findings: overall appearance     Nutrition Follow-Up    RD Follow-up?: Yes

## 2020-10-06 NOTE — ED NOTES
Patient identifiers for Jabier Haider Jr. checked and correct.  LOC:  Jabier Haider Jr. is awake, alert, and aware of environment with an appropriate affect. He is oriented x 3 and speaking appropriately.  APPEARANCE:  He is resting comfortably and in no acute distress. He is clean and well groomed, patient's clothing is properly fastened.  SKIN:  The skin is warm and dry. He has normal skin turgor and moist mucus membranes. Skin is intact; no bruising or breakdown noted.  MUSCULOSKELETAL:  He is moving all extremities well, no obvious deformities noted. Pulses intact.   RESPIRATORY:  Airway is open and patent. Respirations are spontaneous and non-labored with normal effort and rate.  CARDIAC:   No peripheral edema noted. Capillary refill < 3 seconds. His heart rate is a little fast and will recheck it I a little later. ABDOMEN:  No distention noted.  Soft and non-tender upon palpation.  NEUROLOGICAL:  PERRL. Facial expression is symmetrical. Hand grasps are equal bilaterally. Normal sensation in all extremities when touched with finger.  Allergies reported:    Review of patient's allergies indicates:   Allergen Reactions    Latex, natural rubber      OTHER NOTES:

## 2020-10-06 NOTE — PLAN OF CARE
Recommendation:   1. Encourage pt to increase intake of meals as appropriate   2. regular diet is appropriate    Interventions:  General Healthful Diet    Goals: consume at least 50-75% of meals by follow-up  Nutrition Goal Status: new    Nutrition Discharge Planning: regular diet

## 2020-10-06 NOTE — PLAN OF CARE
Admit note : the patient was accepted from [facility] . He arrived on the unit with security and [employer] .body and property  searches were done . He is a 29 year old male who was brought in for suicidal ideations . He reports an argument with his wife jessica and they been  for the past few days . It was reported by the police to the [facility] nursing staff that they broke into his house because he had a noose wrapped around his neck .there were picturse of his family scattered around the floor . Patient denies this happening but this is reported by police . Patient uses marijuana , denies other drugs .

## 2020-10-06 NOTE — ED PROVIDER NOTES
Encounter Date: 10/5/2020       History     Chief Complaint   Patient presents with    Suicidal     Sent suicidal texts to wife and was found by police attempting to hang himself.     This is a is a 29-year-old male who is brought in for suicidal ideation.  Patient reports an argument with his wife tonight and they have been  for the past couple days.  He it was reported by police who the nursing staff spoke with and stated that the police broke into his house because the patient had a noose wrapped around his neck.  There are pictures of his family scattered around the floor.  Patient denies this happening but this is reported by please to nursing staff.  Patient denies any drug use other than marijuana he says he does occasionally drink alcohol but not often denied any tonight.  Denies any medical problems.        Review of patient's allergies indicates:   Allergen Reactions    Latex, natural rubber      No past medical history on file.  Past Surgical History:   Procedure Laterality Date    DENTAL SURGERY      ear      tubes     No family history on file.  Social History     Tobacco Use    Smoking status: Current Every Day Smoker     Packs/day: 1.00   Substance Use Topics    Alcohol use: No    Drug use: Not on file     Review of Systems   Constitutional: Negative for chills and fever.   HENT: Negative for sore throat.    Respiratory: Negative for shortness of breath.    Cardiovascular: Negative for chest pain.   Gastrointestinal: Negative for abdominal pain, nausea and vomiting.   Genitourinary: Negative for dysuria.   Musculoskeletal: Negative for back pain.   Skin: Negative for rash.   Neurological: Negative for weakness.   Hematological: Does not bruise/bleed easily.   Psychiatric/Behavioral: Positive for suicidal ideas. The patient is not hyperactive.    All other systems reviewed and are negative.      Physical Exam     Initial Vitals [10/06/20 0005]   BP Pulse Resp Temp SpO2   (!) 142/93 (!)  132 18 98.2 °F (36.8 °C) 98 %      MAP       --         Physical Exam    Nursing note and vitals reviewed.  Constitutional: He appears well-developed and well-nourished. He is not diaphoretic. No distress.   HENT:   Head: Normocephalic and atraumatic.   Mouth/Throat: Oropharynx is clear and moist. No oropharyngeal exudate.   Eyes: Conjunctivae and EOM are normal. Pupils are equal, round, and reactive to light.   Neck: No tracheal deviation present.   Cardiovascular: Regular rhythm, normal heart sounds and intact distal pulses.   No murmur heard.  Tachycardic   Pulmonary/Chest: Breath sounds normal. No stridor. No respiratory distress. He has no wheezes. He has no rhonchi. He has no rales.   Abdominal: Soft. Bowel sounds are normal. He exhibits no distension. There is no abdominal tenderness. There is no rebound and no guarding.   Musculoskeletal: Normal range of motion. No tenderness or edema.   Neurological: He is alert and oriented to person, place, and time. He has normal strength and normal reflexes. No cranial nerve deficit or sensory deficit.   Skin: Skin is warm and dry. Capillary refill takes less than 2 seconds. No rash noted. No erythema. No pallor.   Psychiatric: His affect is blunt. He is not actively hallucinating. Thought content is not paranoid and not delusional. He exhibits a depressed mood. He expresses no homicidal and no suicidal ideation. He expresses no suicidal plans and no homicidal plans.         ED Course   Procedures  Labs Reviewed   COMPREHENSIVE METABOLIC PANEL - Abnormal; Notable for the following components:       Result Value    Glucose 147 (*)     All other components within normal limits   SALICYLATE LEVEL - Abnormal; Notable for the following components:    Salicylate Lvl <4.0 (*)     All other components within normal limits   CBC W/ AUTO DIFFERENTIAL   ALCOHOL,MEDICAL (ETHANOL)   ACETAMINOPHEN LEVEL   SARS-COV-2 RNA AMPLIFICATION, QUAL   URINALYSIS, REFLEX TO URINE CULTURE   DRUG  SCREEN PANEL, URINE EMERGENCY   URINALYSIS MICROSCOPIC           Results for orders placed or performed during the hospital encounter of 10/06/20   CBC auto differential   Result Value Ref Range    WBC 9.10 3.90 - 12.70 K/uL    RBC 4.79 4.60 - 6.20 M/uL    Hemoglobin 14.7 14.0 - 18.0 g/dL    Hematocrit 42.6 40.0 - 54.0 %    Mean Corpuscular Volume 89 82 - 98 fL    Mean Corpuscular Hemoglobin 30.7 27.0 - 31.0 pg    Mean Corpuscular Hemoglobin Conc 34.5 32.0 - 36.0 g/dL    RDW 11.8 11.5 - 14.5 %    Platelets 256 150 - 350 K/uL    MPV 9.2 9.2 - 12.9 fL    Immature Granulocytes 0.2 0.0 - 0.5 %    Gran # (ANC) 5.6 1.8 - 7.7 K/uL    Immature Grans (Abs) 0.02 0.00 - 0.04 K/uL    Lymph # 2.5 1.0 - 4.8 K/uL    Mono # 0.8 0.3 - 1.0 K/uL    Eos # 0.2 0.0 - 0.5 K/uL    Baso # 0.04 0.00 - 0.20 K/uL    nRBC 0 0 /100 WBC    Gran% 62.0 38.0 - 73.0 %    Lymph% 27.0 18.0 - 48.0 %    Mono% 8.8 4.0 - 15.0 %    Eosinophil% 1.6 0.0 - 8.0 %    Basophil% 0.4 0.0 - 1.9 %    Differential Method Automated    Comprehensive metabolic panel   Result Value Ref Range    Sodium 139 136 - 145 mmol/L    Potassium 3.6 3.5 - 5.1 mmol/L    Chloride 101 95 - 110 mmol/L    CO2 25 23 - 29 mmol/L    Glucose 147 (H) 70 - 110 mg/dL    BUN, Bld 20 6 - 20 mg/dL    Creatinine 1.0 0.5 - 1.4 mg/dL    Calcium 9.4 8.7 - 10.5 mg/dL    Total Protein 7.4 6.0 - 8.4 g/dL    Albumin 4.8 3.5 - 5.2 g/dL    Total Bilirubin 0.7 0.1 - 1.0 mg/dL    Alkaline Phosphatase 59 55 - 135 U/L    AST 20 10 - 40 U/L    ALT 17 10 - 44 U/L    Anion Gap 13 8 - 16 mmol/L    eGFR if African American >60.0 >60 mL/min/1.73 m^2    eGFR if non African American >60.0 >60 mL/min/1.73 m^2   Ethanol   Result Value Ref Range    Alcohol, Medical, Serum <5 <10 mg/dL   Acetaminophen level   Result Value Ref Range    Acetaminophen (Tylenol), Serum <10.0 10.0 - 20.0 ug/mL   COVID-19 Rapid Screening   Result Value Ref Range    SARS-CoV-2 RNA, Amplification, Qual Negative Negative   Salicylate level   Result  Value Ref Range    Salicylate Lvl <4.0 (L) 15.0 - 30.0 mg/dL      No orders to display       Imaging Results    None          Medical Decision Making:   ED Management:  Patient presents emergency department with depression suicidal ideation.  Please found him with a noose around his neck.  Patient admits to being depressed and in a fight with his wife.  PEC has been filled out patient medically clear for transfer to psychiatric facility.                             Clinical Impression:     ICD-10-CM ICD-9-CM   1. Suicidal ideation  R45.851 V62.84   2. Depression with suicidal ideation  F32.9 311    R45.851 V62.84   3. Medical clearance for psychiatric admission  Z00.8 V70.8                          ED Disposition Condition    Transfer to Psych Facility         ED Prescriptions     None        Follow-up Information    None                                      Corbin Thomas DO  10/06/20 0230

## 2020-10-07 PROBLEM — R73.09 ELEVATED GLUCOSE: Status: ACTIVE | Noted: 2020-10-07

## 2020-10-07 PROBLEM — R45.851 DEPRESSION WITH SUICIDAL IDEATION: Status: ACTIVE | Noted: 2020-10-06

## 2020-10-07 PROBLEM — F41.1 GAD (GENERALIZED ANXIETY DISORDER): Status: ACTIVE | Noted: 2020-10-07

## 2020-10-07 PROBLEM — F19.10 POLYSUBSTANCE ABUSE: Status: ACTIVE | Noted: 2020-10-07

## 2020-10-07 LAB
CHOLEST SERPL-MCNC: 120 MG/DL (ref 120–199)
CHOLEST/HDLC SERPL: 2.4 {RATIO} (ref 2–5)
ESTIMATED AVG GLUCOSE: 108 MG/DL (ref 68–131)
HBA1C MFR BLD HPLC: 5.4 % (ref 4–5.6)
HDLC SERPL-MCNC: 50 MG/DL (ref 40–75)
HDLC SERPL: 41.7 % (ref 20–50)
LDLC SERPL CALC-MCNC: 57 MG/DL (ref 63–159)
NONHDLC SERPL-MCNC: 70 MG/DL
TRIGL SERPL-MCNC: 65 MG/DL (ref 30–150)

## 2020-10-07 PROCEDURE — 80061 LIPID PANEL: CPT

## 2020-10-07 PROCEDURE — 11400000 HC PSYCH PRIVATE ROOM

## 2020-10-07 PROCEDURE — 99231 PR SUBSEQUENT HOSPITAL CARE,LEVL I: ICD-10-PCS | Mod: ,,, | Performed by: NURSE PRACTITIONER

## 2020-10-07 PROCEDURE — 99231 SBSQ HOSP IP/OBS SF/LOW 25: CPT | Mod: ,,, | Performed by: NURSE PRACTITIONER

## 2020-10-07 PROCEDURE — 83036 HEMOGLOBIN GLYCOSYLATED A1C: CPT

## 2020-10-07 PROCEDURE — S4991 NICOTINE PATCH NONLEGEND: HCPCS | Performed by: PSYCHIATRY & NEUROLOGY

## 2020-10-07 PROCEDURE — 25000003 PHARM REV CODE 250: Performed by: PSYCHIATRY & NEUROLOGY

## 2020-10-07 PROCEDURE — 25000003 PHARM REV CODE 250: Performed by: STUDENT IN AN ORGANIZED HEALTH CARE EDUCATION/TRAINING PROGRAM

## 2020-10-07 PROCEDURE — 99233 PR SUBSEQUENT HOSPITAL CARE,LEVL III: ICD-10-PCS | Mod: ,,, | Performed by: STUDENT IN AN ORGANIZED HEALTH CARE EDUCATION/TRAINING PROGRAM

## 2020-10-07 PROCEDURE — 36415 COLL VENOUS BLD VENIPUNCTURE: CPT

## 2020-10-07 PROCEDURE — 99233 SBSQ HOSP IP/OBS HIGH 50: CPT | Mod: ,,, | Performed by: STUDENT IN AN ORGANIZED HEALTH CARE EDUCATION/TRAINING PROGRAM

## 2020-10-07 RX ORDER — ESCITALOPRAM OXALATE 10 MG/1
10 TABLET ORAL DAILY
Status: DISCONTINUED | OUTPATIENT
Start: 2020-10-08 | End: 2020-10-09 | Stop reason: HOSPADM

## 2020-10-07 RX ADMIN — ESCITALOPRAM OXALATE 5 MG: 5 TABLET, FILM COATED ORAL at 09:10

## 2020-10-07 RX ADMIN — FOLIC ACID 1 MG: 1 TABLET ORAL at 09:10

## 2020-10-07 RX ADMIN — MUPIROCIN: 20 OINTMENT TOPICAL at 08:10

## 2020-10-07 RX ADMIN — Medication 1 PATCH: at 07:10

## 2020-10-07 RX ADMIN — Medication 1 PATCH: at 11:10

## 2020-10-07 RX ADMIN — MUPIROCIN: 20 OINTMENT TOPICAL at 11:10

## 2020-10-07 RX ADMIN — HYDROXYZINE PAMOATE 50 MG: 50 CAPSULE ORAL at 09:10

## 2020-10-07 RX ADMIN — OLANZAPINE 10 MG: 10 TABLET, FILM COATED ORAL at 06:10

## 2020-10-07 RX ADMIN — ACETAMINOPHEN 650 MG: 325 TABLET ORAL at 02:10

## 2020-10-07 RX ADMIN — THERA TABS 1 TABLET: TAB at 09:10

## 2020-10-07 RX ADMIN — HYDROXYZINE PAMOATE 50 MG: 50 CAPSULE ORAL at 02:10

## 2020-10-07 RX ADMIN — ACETAMINOPHEN 650 MG: 325 TABLET ORAL at 09:10

## 2020-10-07 NOTE — PROGRESS NOTES
"PSYCHIATRY DAILY INPATIENT PROGRESS NOTE  SUBSEQUENT HOSPITAL VISIT    ENCOUNTER DATE: 10/7/2020  SITE: [facility]    DATE OF ADMISSION: 10/6/2020  8:08 AM  LENGTH OF STAY: 1 days      HISTORY    CHIEF COMPLAINT   Jabier Haider Jr. is a 29 y.o. male, seen during daily licea rounds on the inpatient unit.  Jabier Haider Jr. presents with the chief complaint of depression    HPI:  (Elements: Location, Quality, Severity, Duration, Timing, Content, Modifying Factors, Associated Signs & Symptoms)    The patient was seen and examined. The chart was reviewed.   Reviewed notes by RN, SW, NP, RD from the last 24 hours.  The patient's case was discussed with the treatment team and care providers today.  Staff reports no behavioral or management issues.   The patient has been compliant with treatment. The patient denied any side effects.      Subjective 10/07/2020  Discussed stressor of relationship with wife. He states he has been stressing about "kids, bills, job.. normal things.. caught a hold of me a little bit faster."     "I felt lower than low... it's been building, really hit me last Tuesday, I didn't know what to do... how to catch up on bills.. I broke down crying, crying everyday since then.. it's hard to explain, felt like my family wasn't coming back... my wife wanted time apart, I felt like we could do it together." Protective factors for suicide are "I'd be letting my family down... I've got two kids."           Depressed mood - trend: decreasing  Interest/pleasure/anhedonia: trend: decreasing  Guilt/hopelessness/worthlessness - trend: decreasing  Changes in Sleep - trend: fluctuating a bit  Changes in appetite - trend: stable   Changes in concentrations - trend: decreasing steadily  Suicidal- active/passive ideations - trend: decreasing steadily  Homicidal ideations: active/passive ideations - trend: none    Hallucinations - trend: none  Paranoia - trend: none  Delusions - trend: " none  Disorganized behavior/speech - trend: none  Negative symptoms - trend: none    Elevated mood - trend: none  Racing thoughts - trend: none  Impulsivity - trend: none  Agitation - trend: none  Irritability- trend: none    Symptoms of BETSY - trend: decreasing steadily  Symptoms of Panic Disorder- trend: none  Symptoms of PTSD - none      Psychotherapy:  · Target symptoms: depression  · Why chosen therapy is appropriate versus another modality: relevant to diagnosis, evidence based practice  · Outcome monitoring methods: self-report, observation  · Therapeutic intervention type: supportive psychotherapy  · Topics discussed/themes: relationships difficulties  · The patient's response to the intervention is accepting. The patient's progress toward treatment goals is fair.   · Duration of intervention: 17 minutes.      ROS  Review of Systems   Constitutional: Negative for chills and fever.   HENT: Negative for hearing loss.    Eyes: Negative for blurred vision and double vision.   Respiratory: Negative for shortness of breath.    Cardiovascular: Negative for chest pain and palpitations.   Gastrointestinal: Negative for constipation, diarrhea, nausea and vomiting.   Genitourinary: Negative for dysuria.   Musculoskeletal: Positive for back pain.   Skin: Negative for rash.   Neurological: Negative for dizziness and headaches.   Endo/Heme/Allergies: Negative for environmental allergies.         PAST MEDICAL HISTORY   Past Medical History:   Diagnosis Date    Addiction to drug     Depression     Psychiatric problem            PSYCHOTROPIC MEDICATIONS   Scheduled Meds:   escitalopram oxalate  5 mg Oral Daily    folic acid  1 mg Oral Daily    multivitamin  1 tablet Oral Daily    mupirocin   Nasal BID    nicotine  1 patch Transdermal Daily     Continuous Infusions:  PRN Meds:.acetaminophen, aluminum-magnesium hydroxide-simethicone, docusate sodium, hydrOXYzine pamoate, loperamide, OLANZapine **AND** OLANZapine,  polyvinyl alcohol (artificial tears)        EXAMINATION    VITALS   Vitals:    10/07/20 0800   BP: 128/76   Pulse: 63   Resp: 18   Temp: 97.4 °F (36.3 °C)         CONSTITUTIONAL  General Appearance: unremarkable, age appropriate    MUSCULOSKELETAL  Muscle Strength and Tone:no tremor, no tic  Abnormal Involuntary Movements: No  Gait and Station: non-ataxic    PSYCHIATRIC   Level of Consciousness: awake and alert   Orientation: person, place and situation  Grooming: Casually dressed and Well groomed  Psychomotor Behavior: normal, cooperative  Speech: normal tone, normal rate, normal pitch, normal volume  Language: grossly intact  Mood: fine  Affect: Consistent with mood  Thought Process: linear, logical  Associations: intact   Thought Content: DENIES suicidal ideation and DENIES homicidal ideation  Perceptions: denies hallucinations  Memory: Able to recall past events, Remote intact and Recent intact  Attention:Attends to interview without distraction  Fund of Knowledge: Aware of current events and Vocabulary appropriate   Estimate if Intelligence:  Average based on work/education history, vocabulary and mental status exam  Insight: has awareness of illness  Judgment: behavior is adequate to circumstances      DIAGNOSTIC TESTING   Laboratory Results  Recent Results (from the past 24 hour(s))   Lipid panel    Collection Time: 10/07/20  6:40 AM   Result Value Ref Range    Cholesterol 120 120 - 199 mg/dL    Triglycerides 65 30 - 150 mg/dL    HDL 50 40 - 75 mg/dL    LDL Cholesterol 57.0 (L) 63.0 - 159.0 mg/dL    Hdl/Cholesterol Ratio 41.7 20.0 - 50.0 %    Total Cholesterol/HDL Ratio 2.4 2.0 - 5.0    Non-HDL Cholesterol 70 mg/dL         ASSESSMENT      IMPRESSION   BETSY  Suicidal ideations  Adjustment disorder with depressed mood vs. Other specified depressive disorder (limited symptoms, patient may be minimizing)  Panic attacks     Elevated Blood Glucose     Opiate dependence  Amphetamine abuse  Cannabis  use          MEDICAL DECISION MAKING     PROBLEM LIST AND MANAGEMENT PLANS     BETSY  - increase Lexapro from 5 to 10 mg PO qd starting tomorrow; titrate as needed/tolerated  - follow up with outpatient mental health provider after discharge     Suicidal ideations  - continue psychiatric hospitalization  - provide psychotherapeutic interventions and medication management     Psychosocial stressors  - SW consulted for assistance with additional resources      Adjustment disorder with depressed mood  - pt counseled  - follow up with outpatient MH provider after discharge     Panic attacks  - lexapro as above  - follow up with outpatient MH provider      Elevated Blood Glucose  - FM consulted  - f/u A1c     Polysubstance abuse  - pt counseled  - SW consulted for assistance with additional resources         PRESCRIPTION DRUG MANAGEMENT  Compliance: Yes  Side Effects: No  Regimen Adjustments: see above      Discussed diagnosis, risks and benefits of proposed treatment vs alternative treatments vs no treatment, potential side effects of these treatments and the inherent unpredictability of treatment. The patient expresses understanding of the above and displays the capacity to agree with this treatment given said understanding. Patient also agrees that, currently, the benefits outweigh the risks and would like to pursue/continue treatment at this time.      DISCHARGE PLANNING  Expected Disposition Plan: Home or Self Care      NEED FOR CONTINUED HOSPITALIZATION  Psychiatric illness continues to pose a potential threat to life or bodily function, of self or others, thereby requiring the need for continued inpatient psychiatric hospitalization: Yes as evidenced by : danger to self    Protective inpatient pyschiatric hospitalization required while a safe disposition plan is enacted: Yes    Patient stabilized and ready for discharge from inpatient psychiatric unit: No      STAFF:   Harvey Mcgee III, MD  Psychiatry

## 2020-10-07 NOTE — PROGRESS NOTES
10/07/20 1050   [facility] Group Therapy   Group Name Therapeutic Recreation   Specific Interventions Cognitive Stimulation Training   Participation Level Appropriate   Participation Quality Requires Prompting;Lack of Interest   Insight/Motivation Improved   Affect/Mood Display Appropriate   Cognition Alert   Psychomotor WNL   Patient had his back to the group looking out the window, with encouragement participated, wrote answers and shared when asked.

## 2020-10-07 NOTE — PLAN OF CARE
Lying quiet in bed, eyes closed, respiration even and unlabored, appearing asleep since 1900.  Slept well all shift.  Did awaken for VS, then later for snacks and PM meds. Requested and given prn vistaril and tylenol at 2059.  Returned to sleep quickly after both times.  Safety and precautions maintained with rounds every 15 minutes, bed is fixed in a low position and pathways kept clear.  No fall occurred.

## 2020-10-07 NOTE — PLAN OF CARE
Shift note : patient is eating all meals and is taking all ordered medications . He is cooperative . He is attending all groups to improve his coping skills .

## 2020-10-07 NOTE — PLAN OF CARE
"History of Present Illness   Jabier Haider Jr. is a 29-year-old. male with a past psychiatric history of no diagnosis currently admitted to the inpatient unit with the following chief complaint: thoughts of death/suicide.    Current:  The patient is dressed in his own clothes and hospital scrubs. He is wearing a mask. "Nothing different than anybody else. We have arguments. Everyday life." He is  from his wife. His wife is staying with her sister or mother. He wanted her to come home so that he could see his kids. They have been together going on for 11 years. He is having stress due to kids, family, financial, etc. "All the worries built up and I had a crash moment."  It all built up last Tuesday. He broke down and began crying. He felt "broken." He has been crying every day since hen. His wife felt like she needed time apart, get a job, and not having to depend on him. He feels like he got the help he needed. "I just need to take a minute and think about it." The doctor discussed the patient's medication. He has a hard time staying asleep. He denies suicidal thoughts. He plans to go fix his door when he leaves. His kids are with his mother. He is not sure where his wife is. She may be at her mother or sister's house.  He said that he was not really "going to do it" in reference to having a noose with pictures of his family on the ground.He agreed to go to counseling.b ut stated that financially it was not possible. He stated that she is actually his fiance not his wife. His fiance was worried about him and called the police. He agreed to sign a release for [facility] to [place] his fiance.          Plan:  The psychiatrist will adjust medications as needed. Staff will engage the patient in groups and/or 1:1s for coping skill enhancement and social skill development. Nursing will engage the patient education and administer medications as needed.  "

## 2020-10-07 NOTE — CONSULTS
"[facility] Medicine  Consult Note    Patient Name: Jabier Haider Jr.  MRN: [id number]  Admission Date: 10/6/2020  Hospital Length of Stay: 1 days  Attending Physician: Kristian Valentin MD   Primary Care Provider: Byron Espinoza MD           Patient information was obtained from patient, past medical records and ER records.     Inpatient consult to [facility] for History and Physical  Consult performed by: Martina Keyes NP  Consult ordered by: Kristian Valentin MD        Subjective:     Principal Problem: <principal problem not specified>    Chief Complaint: No chief complaint on file.       HPI: Jabier Haider Jr. is a 29 y.o. male with no past psychiatric hx now admitted to [facility] with depression and SI.   Reported argument with his wife;  they have been  for the past couple days.  Police reported to the nursing staff they had to break into his house because the patient had a noose wrapped around his neck.   Pt reports  feeling depressed, "it comes and goes... for a couple months." Reports anxiety "keeps me up," for "a good long time," over 6 months. Reports frequent panic attacks, "every other week." Reports using "Pain pills more often than not," marijuana "every day," since 13 years of age, and took adderall "to help me stay awake, I hadn't been sleeping since my wife wasn't there."   UDS + THC, Opiate, amphetamine     Past Medical History:   Diagnosis Date    Addiction to drug     Depression     Psychiatric problem        Past Surgical History:   Procedure Laterality Date    DENTAL SURGERY      ear      tubes       Review of patient's allergies indicates:   Allergen Reactions    Latex, natural rubber        No current facility-administered medications on file prior to encounter.      Current Outpatient Medications on File Prior to Encounter   Medication Sig    diclofenac (VOLTAREN) 50 MG EC tablet Take 1 tablet (50 mg total) by mouth 3 " (three) times daily as needed (pain).    DM/P-EPHED/ACETAMINOPH/DOXYLAM (NYQUIL ORAL) Take by mouth daily as needed.    ondansetron (ZOFRAN-ODT) 4 MG TbDL Take 1 tablet (4 mg total) by mouth every 8 (eight) hours as needed.    sucralfate (CARAFATE) 1 gram tablet 4 by mouth mixed with cranberry juice.  Gargle and swallow before bedtime.    urea 45 % Crea Apply 1 application topically 2 (two) times daily.     Family History     None        Tobacco Use    Smoking status: Current Every Day Smoker     Packs/day: 1.00     Types: Cigarettes   Substance and Sexual Activity    Alcohol use: No    Drug use: Yes     Types: Marijuana    Sexual activity: Not on file     Review of Systems   Constitutional: Negative for chills and fever.   HENT: Negative for congestion and sore throat.    Respiratory: Negative for cough, chest tightness and shortness of breath.    Cardiovascular: Negative for chest pain, palpitations and leg swelling.   Gastrointestinal: Negative for abdominal pain, diarrhea, nausea and vomiting.   Genitourinary: Negative for flank pain, frequency and hematuria.   Musculoskeletal: Negative for back pain and neck pain.   Skin: Negative for rash and wound.   Neurological: Negative for dizziness, seizures, syncope and headaches.   Psychiatric/Behavioral: Positive for dysphoric mood and suicidal ideas. Negative for agitation, confusion and self-injury.     Objective:     Vital Signs (Most Recent):  Temp: 97.4 °F (36.3 °C) (10/07/20 0800)  Pulse: 63 (10/07/20 0800)  Resp: 18 (10/07/20 0800)  BP: 128/76 (10/07/20 0800)  SpO2: 99 % (10/06/20 0900) Vital Signs (24h Range):  Temp:  [96.4 °F (35.8 °C)-98.8 °F (37.1 °C)] 97.4 °F (36.3 °C)  Pulse:  [63-85] 63  Resp:  [18-20] 18  SpO2:  [99 %] 99 %  BP: (119-133)/(62-83) 128/76     Weight: 61.3 kg (135 lb 0.5 oz)  Body mass index is 20.53 kg/m².    Physical Exam  Vitals signs and nursing note reviewed.   Constitutional:       General: He is not in acute distress.      Appearance: He is well-developed.   HENT:      Head: Normocephalic and atraumatic.   Eyes:      Pupils: Pupils are equal, round, and reactive to light.   Neck:      Thyroid: No thyromegaly.   Cardiovascular:      Rate and Rhythm: Normal rate and regular rhythm.      Heart sounds: Normal heart sounds. No murmur.   Pulmonary:      Effort: Pulmonary effort is normal. No respiratory distress.      Breath sounds: Normal breath sounds. No wheezing or rales.   Abdominal:      General: Bowel sounds are normal. There is no distension.      Palpations: Abdomen is soft. There is no mass.      Tenderness: There is no abdominal tenderness.   Musculoskeletal: Normal range of motion.         General: No deformity.   Lymphadenopathy:      Cervical: No cervical adenopathy.   Skin:     General: Skin is warm and dry.      Findings: No erythema or rash.   Neurological:      Mental Status: He is alert and oriented to person, place, and time.      Comments: CN II visual fields full to confrontation  CN III, Iv, VI- pupils ERRL   CN III- no palsy  Nystagmus; none   Diplopia- none  ophthalmoparesis- none  CN V- facial sensation intact  CN VII- facial expression full and symmetric  CNVIII- normal  CN IV-Normal  CN X- normal  CN XI- Normal   CN XII normal      Psychiatric:      Comments: Making bed when entered pt's room,    Cooperative, quiet,          Significant Labs:   UPT  No results found for this or any previous visit.  U/A  No results found for this or any previous visit.  UDS  Results for orders placed or performed during the hospital encounter of 10/06/20   Drug screen panel, emergency   Result Value Ref Range    Benzodiazepines Negative     Cocaine (Metab.) Negative     Opiate Scrn, Ur Presumptive Positive     Barbiturate Screen, Ur Negative     Amphetamine Screen, Ur Presumptive Positive     THC Presumptive Positive     Phencyclidine Negative     Creatinine, Random Ur 242.0 23.0 - 375.0 mg/dL    Toxicology Information SEE COMMENT       CBC  Results for orders placed or performed during the hospital encounter of 10/06/20   CBC auto differential   Result Value Ref Range    WBC 9.10 3.90 - 12.70 K/uL    RBC 4.79 4.60 - 6.20 M/uL    Hemoglobin 14.7 14.0 - 18.0 g/dL    Hematocrit 42.6 40.0 - 54.0 %    Mean Corpuscular Volume 89 82 - 98 fL    Mean Corpuscular Hemoglobin 30.7 27.0 - 31.0 pg    Mean Corpuscular Hemoglobin Conc 34.5 32.0 - 36.0 g/dL    RDW 11.8 11.5 - 14.5 %    Platelets 256 150 - 350 K/uL    MPV 9.2 9.2 - 12.9 fL    Immature Granulocytes 0.2 0.0 - 0.5 %    Gran # (ANC) 5.6 1.8 - 7.7 K/uL    Immature Grans (Abs) 0.02 0.00 - 0.04 K/uL    Lymph # 2.5 1.0 - 4.8 K/uL    Mono # 0.8 0.3 - 1.0 K/uL    Eos # 0.2 0.0 - 0.5 K/uL    Baso # 0.04 0.00 - 0.20 K/uL    nRBC 0 0 /100 WBC    Gran% 62.0 38.0 - 73.0 %    Lymph% 27.0 18.0 - 48.0 %    Mono% 8.8 4.0 - 15.0 %    Eosinophil% 1.6 0.0 - 8.0 %    Basophil% 0.4 0.0 - 1.9 %    Differential Method Automated      CMP  Results for orders placed or performed during the hospital encounter of 10/06/20   Comprehensive metabolic panel   Result Value Ref Range    Sodium 139 136 - 145 mmol/L    Potassium 3.6 3.5 - 5.1 mmol/L    Chloride 101 95 - 110 mmol/L    CO2 25 23 - 29 mmol/L    Glucose 147 (H) 70 - 110 mg/dL    BUN, Bld 20 6 - 20 mg/dL    Creatinine 1.0 0.5 - 1.4 mg/dL    Calcium 9.4 8.7 - 10.5 mg/dL    Total Protein 7.4 6.0 - 8.4 g/dL    Albumin 4.8 3.5 - 5.2 g/dL    Total Bilirubin 0.7 0.1 - 1.0 mg/dL    Alkaline Phosphatase 59 55 - 135 U/L    AST 20 10 - 40 U/L    ALT 17 10 - 44 U/L    Anion Gap 13 8 - 16 mmol/L    eGFR if African American >60.0 >60 mL/min/1.73 m^2    eGFR if non African American >60.0 >60 mL/min/1.73 m^2     TSH  No results found for this or any previous visit.  ETOH  Results for orders placed or performed during the hospital encounter of 10/06/20   Ethanol   Result Value Ref Range    Alcohol, Medical, Serum <5 <10 mg/dL     Salicylate  Results for orders placed or performed  during the hospital encounter of 10/06/20   Salicylate level   Result Value Ref Range    Salicylate Lvl <4.0 (L) 15.0 - 30.0 mg/dL     Acetaminophen  Results for orders placed or performed during the hospital encounter of 10/06/20   Acetaminophen level   Result Value Ref Range    Acetaminophen (Tylenol), Serum <10.0 10.0 - 20.0 ug/mL     Covid 19 - negative   AiC in process   Glucose non fasting- 147  Lipids- Chol 120, Tg- 65 , HDL- 50, LDL 57,     Significant Imaging: none     Assessment/Plan:     BETSY (generalized anxiety disorder)  Per psychiatry       Elevated glucose  Non fasting  A1C in process       Polysubstance abuse  Per psychiatry       Depression with suicidal ideation  Per psychiatry         VTE Risk Mitigation (From admission, onward)    None              Thank you for your consult. I will sign off. Please contact us if you have any additional questions.    Martina Keyes, NP  Department of Hospital Medicine   [facility]

## 2020-10-07 NOTE — HPI
"Jabier Haider Jr. is a 29 y.o. male with no past psychiatric hx now admitted to [facility] with depression and SI.   Reported argument with his wife;  they have been  for the past couple days.  Police reported to the nursing staff they had to break into his house because the patient had a noose wrapped around his neck.   Pt reports  feeling depressed, "it comes and goes... for a couple months." Reports anxiety "keeps me up," for "a good long time," over 6 months. Reports frequent panic attacks, "every other week." Reports using "Pain pills more often than not," marijuana "every day," since 13 years of age, and took adderall "to help me stay awake, I hadn't been sleeping since my wife wasn't there."   UDS + THC, Opiate, amphetamine   "

## 2020-10-07 NOTE — SUBJECTIVE & OBJECTIVE
Past Medical History:   Diagnosis Date    Addiction to drug     Depression     Psychiatric problem        Past Surgical History:   Procedure Laterality Date    DENTAL SURGERY      ear      tubes       Review of patient's allergies indicates:   Allergen Reactions    Latex, natural rubber        No current facility-administered medications on file prior to encounter.      Current Outpatient Medications on File Prior to Encounter   Medication Sig    diclofenac (VOLTAREN) 50 MG EC tablet Take 1 tablet (50 mg total) by mouth 3 (three) times daily as needed (pain).    DM/P-EPHED/ACETAMINOPH/DOXYLAM (NYQUIL ORAL) Take by mouth daily as needed.    ondansetron (ZOFRAN-ODT) 4 MG TbDL Take 1 tablet (4 mg total) by mouth every 8 (eight) hours as needed.    sucralfate (CARAFATE) 1 gram tablet 4 by mouth mixed with cranberry juice.  Gargle and swallow before bedtime.    urea 45 % Crea Apply 1 application topically 2 (two) times daily.     Family History     None        Tobacco Use    Smoking status: Current Every Day Smoker     Packs/day: 1.00     Types: Cigarettes   Substance and Sexual Activity    Alcohol use: No    Drug use: Yes     Types: Marijuana    Sexual activity: Not on file     Review of Systems   Constitutional: Negative for chills and fever.   HENT: Negative for congestion and sore throat.    Respiratory: Negative for cough, chest tightness and shortness of breath.    Cardiovascular: Negative for chest pain, palpitations and leg swelling.   Gastrointestinal: Negative for abdominal pain, diarrhea, nausea and vomiting.   Genitourinary: Negative for flank pain, frequency and hematuria.   Musculoskeletal: Negative for back pain and neck pain.   Skin: Negative for rash and wound.   Neurological: Negative for dizziness, seizures, syncope and headaches.   Psychiatric/Behavioral: Positive for dysphoric mood and suicidal ideas. Negative for agitation, confusion and self-injury.     Objective:     Vital Signs  (Most Recent):  Temp: 97.4 °F (36.3 °C) (10/07/20 0800)  Pulse: 63 (10/07/20 0800)  Resp: 18 (10/07/20 0800)  BP: 128/76 (10/07/20 0800)  SpO2: 99 % (10/06/20 0900) Vital Signs (24h Range):  Temp:  [96.4 °F (35.8 °C)-98.8 °F (37.1 °C)] 97.4 °F (36.3 °C)  Pulse:  [63-85] 63  Resp:  [18-20] 18  SpO2:  [99 %] 99 %  BP: (119-133)/(62-83) 128/76     Weight: 61.3 kg (135 lb 0.5 oz)  Body mass index is 20.53 kg/m².    Physical Exam  Vitals signs and nursing note reviewed.   Constitutional:       General: He is not in acute distress.     Appearance: He is well-developed.   HENT:      Head: Normocephalic and atraumatic.   Eyes:      Pupils: Pupils are equal, round, and reactive to light.   Neck:      Thyroid: No thyromegaly.   Cardiovascular:      Rate and Rhythm: Normal rate and regular rhythm.      Heart sounds: Normal heart sounds. No murmur.   Pulmonary:      Effort: Pulmonary effort is normal. No respiratory distress.      Breath sounds: Normal breath sounds. No wheezing or rales.   Abdominal:      General: Bowel sounds are normal. There is no distension.      Palpations: Abdomen is soft. There is no mass.      Tenderness: There is no abdominal tenderness.   Musculoskeletal: Normal range of motion.         General: No deformity.   Lymphadenopathy:      Cervical: No cervical adenopathy.   Skin:     General: Skin is warm and dry.      Findings: No erythema or rash.   Neurological:      Mental Status: He is alert and oriented to person, place, and time.      Comments: CN II visual fields full to confrontation  CN III, Iv, VI- pupils ERRL   CN III- no palsy  Nystagmus; none   Diplopia- none  ophthalmoparesis- none  CN V- facial sensation intact  CN VII- facial expression full and symmetric  CNVIII- normal  CN IV-Normal  CN X- normal  CN XI- Normal   CN XII normal      Psychiatric:      Comments: Making bed when entered pt's room,    Cooperative, quiet,          Significant Labs:   UPT  No results found for this or any  previous visit.  U/A  No results found for this or any previous visit.  UDS  Results for orders placed or performed during the hospital encounter of 10/06/20   Drug screen panel, emergency   Result Value Ref Range    Benzodiazepines Negative     Cocaine (Metab.) Negative     Opiate Scrn, Ur Presumptive Positive     Barbiturate Screen, Ur Negative     Amphetamine Screen, Ur Presumptive Positive     THC Presumptive Positive     Phencyclidine Negative     Creatinine, Random Ur 242.0 23.0 - 375.0 mg/dL    Toxicology Information SEE COMMENT      CBC  Results for orders placed or performed during the hospital encounter of 10/06/20   CBC auto differential   Result Value Ref Range    WBC 9.10 3.90 - 12.70 K/uL    RBC 4.79 4.60 - 6.20 M/uL    Hemoglobin 14.7 14.0 - 18.0 g/dL    Hematocrit 42.6 40.0 - 54.0 %    Mean Corpuscular Volume 89 82 - 98 fL    Mean Corpuscular Hemoglobin 30.7 27.0 - 31.0 pg    Mean Corpuscular Hemoglobin Conc 34.5 32.0 - 36.0 g/dL    RDW 11.8 11.5 - 14.5 %    Platelets 256 150 - 350 K/uL    MPV 9.2 9.2 - 12.9 fL    Immature Granulocytes 0.2 0.0 - 0.5 %    Gran # (ANC) 5.6 1.8 - 7.7 K/uL    Immature Grans (Abs) 0.02 0.00 - 0.04 K/uL    Lymph # 2.5 1.0 - 4.8 K/uL    Mono # 0.8 0.3 - 1.0 K/uL    Eos # 0.2 0.0 - 0.5 K/uL    Baso # 0.04 0.00 - 0.20 K/uL    nRBC 0 0 /100 WBC    Gran% 62.0 38.0 - 73.0 %    Lymph% 27.0 18.0 - 48.0 %    Mono% 8.8 4.0 - 15.0 %    Eosinophil% 1.6 0.0 - 8.0 %    Basophil% 0.4 0.0 - 1.9 %    Differential Method Automated      CMP  Results for orders placed or performed during the hospital encounter of 10/06/20   Comprehensive metabolic panel   Result Value Ref Range    Sodium 139 136 - 145 mmol/L    Potassium 3.6 3.5 - 5.1 mmol/L    Chloride 101 95 - 110 mmol/L    CO2 25 23 - 29 mmol/L    Glucose 147 (H) 70 - 110 mg/dL    BUN, Bld 20 6 - 20 mg/dL    Creatinine 1.0 0.5 - 1.4 mg/dL    Calcium 9.4 8.7 - 10.5 mg/dL    Total Protein 7.4 6.0 - 8.4 g/dL    Albumin 4.8 3.5 - 5.2 g/dL     Total Bilirubin 0.7 0.1 - 1.0 mg/dL    Alkaline Phosphatase 59 55 - 135 U/L    AST 20 10 - 40 U/L    ALT 17 10 - 44 U/L    Anion Gap 13 8 - 16 mmol/L    eGFR if African American >60.0 >60 mL/min/1.73 m^2    eGFR if non African American >60.0 >60 mL/min/1.73 m^2     TSH  No results found for this or any previous visit.  ETOH  Results for orders placed or performed during the hospital encounter of 10/06/20   Ethanol   Result Value Ref Range    Alcohol, Medical, Serum <5 <10 mg/dL     Salicylate  Results for orders placed or performed during the hospital encounter of 10/06/20   Salicylate level   Result Value Ref Range    Salicylate Lvl <4.0 (L) 15.0 - 30.0 mg/dL     Acetaminophen  Results for orders placed or performed during the hospital encounter of 10/06/20   Acetaminophen level   Result Value Ref Range    Acetaminophen (Tylenol), Serum <10.0 10.0 - 20.0 ug/mL     Covid 19 - negative   AiC in process   Glucose non fasting- 147  Lipids- Chol 120, Tg- 65 , HDL- 50, LDL 57,     Significant Imaging: none

## 2020-10-07 NOTE — PLAN OF CARE
Behavior:  Patient attended and participated in psychotherapy this date. He was dressed in his own clothes and was wearing a mask.    Intervention:  Coping skills was discussed in psychotherapy group this date with an emphasis on encouraging patients to put knowledge strategies, and skills into practice. 2. Assist patients in identifying positive coping skills to better deal with stress and avoid abusing substances.  Response:  The patient stated that his most important coping skills are: Taking a hot bath, playing with a pet, clean his room and putting everything in order, and hugging a family member. The patient came into group late. When he sat down, the nurseRonda was right behind him to speak to him about medication.  Plan:  To continue to encourage patient to attend group psychotherapy and to learn about ways that he may put knowledge, strategies, and positive coping skills into practice.

## 2020-10-08 PROCEDURE — 90833 PSYTX W PT W E/M 30 MIN: CPT | Mod: ,,, | Performed by: STUDENT IN AN ORGANIZED HEALTH CARE EDUCATION/TRAINING PROGRAM

## 2020-10-08 PROCEDURE — 25000003 PHARM REV CODE 250: Performed by: STUDENT IN AN ORGANIZED HEALTH CARE EDUCATION/TRAINING PROGRAM

## 2020-10-08 PROCEDURE — 90833 PR PSYCHOTHERAPY W/PATIENT W/E&M, 30 MIN (ADD ON): ICD-10-PCS | Mod: ,,, | Performed by: STUDENT IN AN ORGANIZED HEALTH CARE EDUCATION/TRAINING PROGRAM

## 2020-10-08 PROCEDURE — 11400000 HC PSYCH PRIVATE ROOM

## 2020-10-08 PROCEDURE — 25000003 PHARM REV CODE 250: Performed by: PSYCHIATRY & NEUROLOGY

## 2020-10-08 PROCEDURE — S4991 NICOTINE PATCH NONLEGEND: HCPCS | Performed by: PSYCHIATRY & NEUROLOGY

## 2020-10-08 PROCEDURE — 99233 PR SUBSEQUENT HOSPITAL CARE,LEVL III: ICD-10-PCS | Mod: ,,, | Performed by: STUDENT IN AN ORGANIZED HEALTH CARE EDUCATION/TRAINING PROGRAM

## 2020-10-08 PROCEDURE — 99233 SBSQ HOSP IP/OBS HIGH 50: CPT | Mod: ,,, | Performed by: STUDENT IN AN ORGANIZED HEALTH CARE EDUCATION/TRAINING PROGRAM

## 2020-10-08 RX ADMIN — HYDROXYZINE PAMOATE 50 MG: 50 CAPSULE ORAL at 03:10

## 2020-10-08 RX ADMIN — FOLIC ACID 1 MG: 1 TABLET ORAL at 09:10

## 2020-10-08 RX ADMIN — THERA TABS 1 TABLET: TAB at 09:10

## 2020-10-08 RX ADMIN — ESCITALOPRAM OXALATE 10 MG: 10 TABLET ORAL at 09:10

## 2020-10-08 RX ADMIN — HYDROXYZINE PAMOATE 50 MG: 50 CAPSULE ORAL at 09:10

## 2020-10-08 RX ADMIN — MUPIROCIN: 20 OINTMENT TOPICAL at 09:10

## 2020-10-08 RX ADMIN — POLYVINYL ALCOHOL 1 DROP: 14 SOLUTION/ DROPS OPHTHALMIC at 09:10

## 2020-10-08 RX ADMIN — Medication 1 PATCH: at 09:10

## 2020-10-08 RX ADMIN — OLANZAPINE 10 MG: 10 TABLET, FILM COATED ORAL at 08:10

## 2020-10-08 RX ADMIN — ACETAMINOPHEN 650 MG: 325 TABLET ORAL at 09:10

## 2020-10-08 RX ADMIN — ACETAMINOPHEN 650 MG: 325 TABLET ORAL at 03:10

## 2020-10-08 NOTE — PLAN OF CARE
Pt is sleeping at this time and has slept 6.5 hours intermittently.  Had received vistaril and zyprexa prn.  Pulled mattress off bed and slept on floor restlessly.  NAD.  Resp even & unlabored.  Pathways clear and bed in low position.  Q 15 minute safety checks ongoing.  All precautions maintained

## 2020-10-08 NOTE — PLAN OF CARE
Care plan discussed with patient, patient agrees with plan. Denies intent to harm self or others. Mood improving.  Medication compliant. Accepts food served on unit. Gait steady, no falls. Tends to isolate in room, slept on mattress which he placed on floor last night. Stressed patient's safety plan, effective coping skills, a drug-free lifestyle, mood improvement and resolution of suicidal behavior.  Will continue to monitor for safety.

## 2020-10-08 NOTE — PROGRESS NOTES
"   10/08/20 1120   [facility] Group Therapy   Group Name Therapeutic Recreation   Specific Interventions Skilled Activity Self-Expression   Participation Level Appropriate;Attentive;Sharing   Participation Quality Cooperative   Insight/Motivation Improved   Affect/Mood Display Anxious   Cognition Alert   Psychomotor WNL   Patient states he is worried about his job and paying his bills. Patient identified areas he he wants to improve in "I need to improve with my social skills and communicate better." Patient identified "walking, cleaning, organizing and cooking as alternate activities to participate in to release negative energy.  "

## 2020-10-08 NOTE — PLAN OF CARE
Behavior:  Patient attended and participated in psychotherapy group today. He was dressed in his own clothes and wearing a mask.    Intervention:  Personal values were discussed in psychotherapy group this date and how personal values plays a part in patients' lives. Patients identified values that are important to them using handout P/C/P - 7.1 from the Group Therapy for Substance Abuse, second edition, 2016. A Stages of Change Manual. Group discussion was had about patients' values, substance abuse behaviors that may not line up with their values, and how they might make some changes.     Response:  The patient stated that his most important values are: Honesty, ambition, self-control, friendship, responsible, and loyalty.    Plan:   To continue to encourage patient to attend group psychotherapy and to learn more about ways that they might change to live in more accord with their values.

## 2020-10-08 NOTE — PLAN OF CARE
Reports feeling anxious, has a headache as well. Administered hydroxyzine 50 mg orally prn anxiety and acetaminophen 650 mg orally prn pain.

## 2020-10-08 NOTE — PROGRESS NOTES
"PSYCHIATRY DAILY INPATIENT PROGRESS NOTE  SUBSEQUENT HOSPITAL VISIT    ENCOUNTER DATE: 10/8/2020  SITE[facility]    DATE OF ADMISSION: 10/6/2020  8:08 AM  LENGTH OF STAY: 2 days      HISTORY    CHIEF COMPLAINT   Jabier Haider Jr. is a 29 y.o. male, seen during daily licea rounds on the inpatient unit.  Jabier Haider Jr. presents with the chief complaint of depression    HPI:  (Elements: Location, Quality, Severity, Duration, Timing, Content, Modifying Factors, Associated Signs & Symptoms)    The patient was seen and examined. The chart was reviewed.   Reviewed notes by RN, SW, NP, RD from the last 24 hours.  The patient's case was discussed with the treatment team and care providers today.  Staff reports no behavioral or management issues.   The patient has been compliant with treatment. The patient denied any side effects.      Subjective 10/08/2020  Reports he is feeling better today, "get the house back how it was... go see my kids, take them to the park." He states he talked about his wife, "she's glad I'm in here to get help... she still loves me, I didn't understand she wanted time alone to fix herself, and wants me to fix myself... been together 10 years.. I thought she wanted to leave me but she just wanted to help things." She states, "I'm okay with it, I understand now.. before I didn't it's better now, when she said she wanted time apart, I gave her two bedrooms." He states regarding incident prior to hospitalization, "was never going to hurt myself.. my kids need me... I did say I would kill myself... but I was frustrated," denies any intent to act on the threat, "I have two kids, I would never do that." Reports rope was "was never around my neck... I was even asking the police why they said that... I think they said that to make it look worse so I'd get sent here." He states he had his dogs leash in hand because he had just gotten home and "I take my dog everywhere... the ropes are " "tied together because it had broken."    Collateral, patient's Mother- [phone]        Depressed mood - trend: decreasing  Interest/pleasure/anhedonia: trend: decreasing  Guilt/hopelessness/worthlessness - trend: decreasing  Changes in Sleep - trend: fluctuating a bit  Changes in appetite - trend: stable   Changes in concentrations - trend: decreasing steadily  Suicidal- active/passive ideations - trend: decreasing steadily  Homicidal ideations: active/passive ideations - trend: none    Hallucinations - trend: none  Paranoia - trend: none  Delusions - trend: none  Disorganized behavior/speech - trend: none  Negative symptoms - trend: none    Elevated mood - trend: none  Racing thoughts - trend: none  Impulsivity - trend: none  Agitation - trend: none  Irritability- trend: none    Symptoms of BETSY - trend: decreasing steadily  Symptoms of Panic Disorder- trend: none  Symptoms of PTSD - none      Psychotherapy:  · Target symptoms: depression  · Why chosen therapy is appropriate versus another modality: relevant to diagnosis, evidence based practice  · Outcome monitoring methods: self-report, observation  · Therapeutic intervention type: supportive psychotherapy  · Topics discussed/themes: relationships difficulties  · The patient's response to the intervention is accepting. The patient's progress toward treatment goals is fair.   · Duration of intervention: 17 minutes.      ROS  Review of Systems   Constitutional: Negative for chills and fever.   HENT: Negative for hearing loss.    Eyes: Negative for blurred vision and double vision.   Respiratory: Negative for shortness of breath.    Cardiovascular: Negative for chest pain and palpitations.   Gastrointestinal: Negative for constipation, diarrhea, nausea and vomiting.   Genitourinary: Negative for dysuria.   Musculoskeletal: Positive for back pain.   Skin: Negative for rash.   Neurological: Negative for dizziness and headaches.   Endo/Heme/Allergies: Negative for " "environmental allergies.       PAST MEDICAL HISTORY   Past Medical History:   Diagnosis Date    Addiction to drug     Depression     Psychiatric problem        PSYCHOTROPIC MEDICATIONS   Scheduled Meds:   escitalopram oxalate  10 mg Oral Daily    folic acid  1 mg Oral Daily    multivitamin  1 tablet Oral Daily    mupirocin   Nasal BID    nicotine  1 patch Transdermal Daily     Continuous Infusions:  PRN Meds:.acetaminophen, aluminum-magnesium hydroxide-simethicone, docusate sodium, hydrOXYzine pamoate, loperamide, OLANZapine **AND** OLANZapine, polyvinyl alcohol (artificial tears)        EXAMINATION    VITALS   Vitals:    10/08/20 0800   BP: 116/72   Pulse: 71   Resp: 18   Temp: 98.3 °F (36.8 °C)         CONSTITUTIONAL  General Appearance: unremarkable, age appropriate    MUSCULOSKELETAL  Muscle Strength and Tone:no tremor, no tic  Abnormal Involuntary Movements: No  Gait and Station: non-ataxic    PSYCHIATRIC   Level of Consciousness: awake and alert   Orientation: person, place and situation  Grooming: Casually dressed and Well groomed  Psychomotor Behavior: normal, cooperative  Speech: normal tone, normal rate, normal pitch, normal volume  Language: grossly intact  Mood: "good"  Affect: Consistent with mood  Thought Process: linear, logical  Associations: intact   Thought Content: DENIES suicidal ideation and DENIES homicidal ideation  Perceptions: denies hallucinations  Memory: Able to recall past events, Remote intact and Recent intact  Attention:Attends to interview without distraction  Fund of Knowledge: Aware of current events and Vocabulary appropriate   Estimate if Intelligence:  Average based on work/education history, vocabulary and mental status exam  Insight: has awareness of illness  Judgment: behavior is adequate to circumstances      DIAGNOSTIC TESTING   Laboratory Results  No results found for this or any previous visit (from the past 24 hour(s)).      ASSESSMENT      IMPRESSION "   BETSY  Suicidal ideations  Adjustment disorder with depressed mood vs. Other specified depressive disorder (limited symptoms, patient may be minimizing)  Panic attacks     Elevated Blood Glucose     Opiate dependence  Amphetamine abuse  Cannabis use          MEDICAL DECISION MAKING     PROBLEM LIST AND MANAGEMENT PLANS     BETSY  - started/continue Lexapro 10 mg PO qd starting tomorrow; titrate as needed/tolerated  - follow up with outpatient mental health provider after discharge     Suicidal ideations  - continue psychiatric hospitalization  - provide psychotherapeutic interventions and medication management     Psychosocial stressors  - SW consulted for assistance with additional resources      Adjustment disorder with depressed mood  - pt counseled  - follow up with outpatient MH provider after discharge     Panic attacks  - lexapro as above  - follow up with outpatient MH provider      Elevated Blood Glucose  - FM consulted  - f/u A1c     Polysubstance abuse  - pt counseled  - SW consulted for assistance with additional resources         PRESCRIPTION DRUG MANAGEMENT  Compliance: Yes  Side Effects: No  Regimen Adjustments: see above    Discussed diagnosis, risks and benefits of proposed treatment vs alternative treatments vs no treatment, potential side effects of these treatments and the inherent unpredictability of treatment. The patient expresses understanding of the above and displays the capacity to agree with this treatment given said understanding. Patient also agrees that, currently, the benefits outweigh the risks and would like to pursue/continue treatment at this time.    DISCHARGE PLANNING  Expected Disposition Plan: Home or Self Care    NEED FOR CONTINUED HOSPITALIZATION  Psychiatric illness continues to pose a potential threat to life or bodily function, of self or others, thereby requiring the need for continued inpatient psychiatric hospitalization: Yes as evidenced by : danger to  self    Protective inpatient pyschiatric hospitalization required while a safe disposition plan is enacted: Yes    Patient stabilized and ready for discharge from inpatient psychiatric unit: No      STAFF:   Harvey Mcgee III, MD  Psychiatry       110

## 2020-10-08 NOTE — PROGRESS NOTES
"   10/08/20 1003   Assessment   Patient's Identification of the Problem Patient anxious, cooperative and reports "better, I want to go home, I miss my kids" mood. Patient states his admit is due to "been stressing for a while and upset about things, arguing, relationship problems." Patient reports he felf broken, been crying and wants his family back. Patient admits to negative leisure lifestyle of cigarettes, marijuana, positive for meth(denies meth states he took a Adderall). Patient reports he is engaged, have 2 boys, HS education/3 years of college, employed(construction work), lives in [place]. Patient verbalized main goal "I need to take more time to relax and think about things."   Leisure Interest Exercise;Listen to Music;Walk;Yard Work;Sit Outside;Fishing;Hunting;Enjoy Family/Friends;Sports;Lutheran;Other (See Comments)  (it's been a while since doing some activities)   Leisure Barriers In Pain;Lack of Transportation;Loss of Interest;Lack of Finances;Drug Use;Fears/Phobias;Other (See Comments)  (back injury,fear spiders,alergic to ants per patients)   Treatment Focus To Improve Mood;To Improve Leisure Awareness/Lifestyle/Interest;To Promote Successful and Safe Self Expression;To Improve Coping Skills;To Educate and Increase Awareness of Sober Free Activities   Per H&P patient admit is due to thoughts of death/suicide.  Treatment Recommendation:   1:1 Intervention (as needed)    Cognitive Stimulation Skilled Activity  Creative Expression Skilled Activity  Mild Exercises Skilled Activity  Stress Management Skilled Activity  Coping Skilled Activity  Leisure Education and Awareness Skilled Activity    Treatment Goal(s):  Long Term Goals Refer To Master Treatment Plan    Short Term Treatment Goal(s)  Patient Will:  Exhibit Improvement in Mood  Demonstrate Constructive Expression of Feelings and Behavior  Identify at Least 2 Coping Skills or Leisure Skills to Reduce Depression and Hopelessness Upon Request from " Therapist  Identify (+) Leisure / Recreation Activities that Promote Wellness and Promote a Sober Lifestyle    Discharge Recommendations:  Encourage Patient to Actively Utilize Available Community Resources to Increase Leisure Involvement to Decrease Signs and Symptoms of Illness  Encourage Patient to Utilize Coping Skills on a Regular Basis to Reduce the Risk of Decompensating and Re-Hospitalizations  Follow Up with After Care Appointments  Continue with Current Leisure Activities

## 2020-10-08 NOTE — PLAN OF CARE
Pt cooperative, anxious at times, out on unit interacting good with staff and peers, talked on phone , showered, med compliant, denies SI at this time, safety precautions maintained, will continue to monitor

## 2020-10-09 VITALS
RESPIRATION RATE: 18 BRPM | TEMPERATURE: 98 F | BODY MASS INDEX: 20.47 KG/M2 | DIASTOLIC BLOOD PRESSURE: 70 MMHG | OXYGEN SATURATION: 99 % | HEART RATE: 71 BPM | WEIGHT: 135.06 LBS | HEIGHT: 68 IN | SYSTOLIC BLOOD PRESSURE: 129 MMHG

## 2020-10-09 PROCEDURE — 25000003 PHARM REV CODE 250: Performed by: STUDENT IN AN ORGANIZED HEALTH CARE EDUCATION/TRAINING PROGRAM

## 2020-10-09 PROCEDURE — 90833 PSYTX W PT W E/M 30 MIN: CPT | Mod: ,,, | Performed by: STUDENT IN AN ORGANIZED HEALTH CARE EDUCATION/TRAINING PROGRAM

## 2020-10-09 PROCEDURE — 99239 HOSP IP/OBS DSCHRG MGMT >30: CPT | Mod: ,,, | Performed by: STUDENT IN AN ORGANIZED HEALTH CARE EDUCATION/TRAINING PROGRAM

## 2020-10-09 PROCEDURE — 90833 PR PSYCHOTHERAPY W/PATIENT W/E&M, 30 MIN (ADD ON): ICD-10-PCS | Mod: ,,, | Performed by: STUDENT IN AN ORGANIZED HEALTH CARE EDUCATION/TRAINING PROGRAM

## 2020-10-09 PROCEDURE — S4991 NICOTINE PATCH NONLEGEND: HCPCS | Performed by: PSYCHIATRY & NEUROLOGY

## 2020-10-09 PROCEDURE — 99239 PR HOSPITAL DISCHARGE DAY,>30 MIN: ICD-10-PCS | Mod: ,,, | Performed by: STUDENT IN AN ORGANIZED HEALTH CARE EDUCATION/TRAINING PROGRAM

## 2020-10-09 PROCEDURE — 25000003 PHARM REV CODE 250: Performed by: PSYCHIATRY & NEUROLOGY

## 2020-10-09 RX ORDER — ESCITALOPRAM OXALATE 10 MG/1
10 TABLET ORAL DAILY
Qty: 30 TABLET | Refills: 1 | Status: SHIPPED | OUTPATIENT
Start: 2020-10-10 | End: 2022-04-20

## 2020-10-09 RX ORDER — IBUPROFEN 200 MG
1 TABLET ORAL DAILY
Qty: 30 PATCH | Refills: 1 | Status: ON HOLD | OUTPATIENT
Start: 2020-10-09 | End: 2022-04-20 | Stop reason: HOSPADM

## 2020-10-09 RX ADMIN — ACETAMINOPHEN 650 MG: 325 TABLET ORAL at 01:10

## 2020-10-09 RX ADMIN — Medication 1 PATCH: at 09:10

## 2020-10-09 RX ADMIN — HYDROXYZINE PAMOATE 50 MG: 50 CAPSULE ORAL at 01:10

## 2020-10-09 RX ADMIN — ESCITALOPRAM OXALATE 10 MG: 10 TABLET ORAL at 09:10

## 2020-10-09 RX ADMIN — MUPIROCIN: 20 OINTMENT TOPICAL at 09:10

## 2020-10-09 RX ADMIN — THERA TABS 1 TABLET: TAB at 09:10

## 2020-10-09 RX ADMIN — FOLIC ACID 1 MG: 1 TABLET ORAL at 09:10

## 2020-10-09 RX ADMIN — POLYVINYL ALCOHOL 1 DROP: 14 SOLUTION/ DROPS OPHTHALMIC at 09:10

## 2020-10-09 NOTE — PLAN OF CARE
Care plan discussed, patient agrees with plan. Denies intent to harm self or others. Mood improving, states is ready to be discharged to home. Medication compliant. Accepts food served on unit. Gait steady, no falls. Isolates in room, slept on mattress which he placed on floor last night. Stressed patient's safety plan, effective coping skills, a drug-free lifestyle, mood improvement and resolution of suicidal behavior.  Will continue to monitor for safety.

## 2020-10-09 NOTE — PLAN OF CARE
Lying quietly in bed, eyes closed, respirations even, unlabored. Apparently asleep. Slept 6 hours thus far with 4 interruptions. Safety precautions maintained. Rounds done every 15 minutes. Bed is fixed in low position and room is uncluttered and pathways are clear.

## 2020-10-09 NOTE — PROGRESS NOTES
Psychotherapy:  · Target symptoms: depression, anxiety   · Why chosen therapy is appropriate versus another modality: relevant to diagnosis, evidence based practice  · Outcome monitoring methods: self-report, observation  · Therapeutic intervention type: supportive psychotherapy  · Topics discussed/themes: building skills sets for symptom management, safety plan, medication compliance, follow up compliance  ·    Called patient's mother with patient present in room after he gave verbal consent to do so and provided her name and phone number. She states he sounds very improved, she has no concerns for his safety at this time, she does not believe he is a danger to himself or others. She states the patient can live with her for the time being and she will monitor him for safety and compliance. MD answered questions and assisted with Mother's concerns. Mother seemed very supportive of patient.    · The patient's response to the intervention is accepting. The patient's progress toward treatment goals is good.   · Duration of intervention: 17 minutes.

## 2020-10-09 NOTE — NURSING
"Pt at nurses station complaining of anxiety, saying "I need my anxiety and agitation medicine and I want a new nicotine patch and I need my sleep medicine."  Pt instructed he cannot get a new nicotine patch until the morning.  Pt produced his old one from his sharp bucket, which was taped onto pt arm.  Pt refused his mupirocin ointment saying he didn't need it.  Zyprexa 10mg po given prn.  Will continue to monitor for safety.  "

## 2020-10-09 NOTE — PLAN OF CARE
The patient will have an outpatient counseling appointment with [facility], [address] [place] [address]  [id number]     The patient was instructed by [facility] to call upon discharge for a 45 minute assessment and they would give him an appointment at that time. The patient chose to wait to call after he is discharged.

## 2020-10-09 NOTE — DISCHARGE SUMMARY
"Discharge Summary  Psychiatry    Admit Date: 10/6/2020    Discharge Date and Time:  10/09/2020 12:26 PM    Attending Physician: Kristian Valentin MD     Discharge Provider: Harvey Mcgee III    Reason for Admission:  CHIEF COMPLAINT   Jabier Haider Jr. is a 29 y.o. male with a past psychiatric history of no diagnosis currently admitted to the inpatient unit with the following chief complaint: thoughts of death/suicide    HPI   The patient was seen and examined. The chart was reviewed.     The patient presented to the ER on 10/6/2020 with complaints of thoughts of death/suicide     The patient was medically cleared and admitted to the U.     Per ED MD:  This is a is a 29-year-old male who is brought in for suicidal ideation.  Patient reports an argument with his wife tonight and they have been  for the past couple days.  He it was reported by police who the nursing staff spoke with and stated that the police broke into his house because the patient had a noose wrapped around his neck.  There are pictures of his family scattered around the floor.  Patient denies this happening but this is reported by please to nursing staff.  Patient denies any drug use other than marijuana he says he does occasionally drink alcohol but not often denied any tonight.  Denies any medical problems.     Psychiatric interview on U:  Patient states "wife and I were arguing on the phone, we both said things we didn't mean, she took me serious... she took me way serious, I was aggravated and mad." Patient states he was "standing on chair because I didn't know who was there and I was trying to see through the blinds, when I jumped off the chair, they said I was trying to hang myself." Reports he had "made a slip knot, like I would on the barge," out of a dog leash... when asked why, he says "no reason at all sir." Reports arguments with wife are regarding, "I don't know.. I never know what it's over." Reports feeling " "depressed, "it comes and goes... for a couple months." Reports anxiety "keeps me up," for "a good long time," over 6 months. Reports frequent panic attacks, "every other week." Reports using "Pain pills more often than not," marijuana "every day," since 13 years of age, and took adderall "to help me stay awake, I hadn't been sleeping since my wife wasn't there." He states he is hopeful he and his wife will repair their relationship, "we always do."         Procedures Performed: * No surgery found *    Hospital Course (synopsis of major diagnoses, care, treatment, and services provided during the course of the hospital stay):   The patient was stabilized and discharged on the following medications:     Medication List      START taking these medications    escitalopram oxalate 10 MG tablet  Commonly known as: LEXAPRO  Take 1 tablet (10 mg total) by mouth once daily.  Start taking on: October 10, 2020     nicotine 14 mg/24 hr  Commonly known as: NICODERM CQ  Place 1 patch onto the skin once daily.        STOP taking these medications    diclofenac 50 MG EC tablet  Commonly known as: VOLTAREN     NYQUIL ORAL     ondansetron 4 MG Tbdl  Commonly known as: ZOFRAN-ODT     sucralfate 1 gram tablet  Commonly known as: CARAFATE     urea 45 % Crea           Where to Get Your Medications      You can get these medications from any pharmacy    Bring a paper prescription for each of these medications  · escitalopram oxalate 10 MG tablet  · nicotine 14 mg/24 hr           The patient was compliant with treatment. The patient denied any side effects.       Discussed diagnosis, risks and benefits of proposed treatment vs alternative treatments vs no treatment, and potential side effects of these treatments.  The patient expresses understanding of the above and displays the capacity to agree with this treatment given said understanding.  Patient also agrees that, currently, the benefits outweigh the risks and would like to pursue " treatment at this time.    MSE: stated age, casually dressed, well groomed.  No psychomotor agitation or retardation.  No abnormal involuntary movements.  Gait normal.  Speech normal, conversational.  Language fluent English. Mood fine.  Affect normal range, pleasant, euthymic.  Thought process linear.  Associations intact.  Denies suicidal or homicidal ideation.  Denies auditory hallucinations, paranoid ideation, ideas of reference.  Memory intact.  Attention intact.  Fund of knowledge intact.  Insight intact.  Judgment intact.  Alert and oriented to person, place, time.      Tobacco Usage:  Is patient a smoker? Yes  Does patient want prescription for Tobacco Cessation? Yes  Does patient want counseling for Tobacco Cessation? Yes    If patient would like to quit, then over the counter nicotine patch could be used. The patient could also follow up with his PCP or psychiatric provider for other alternatives.     Final Diagnoses:    Principal Problem:Adjustment disorder with depressed mood vs. Other specified depressive disorder (limited symptoms, patient may be minimizing)   Secondary Diagnoses:   BETSY  Suicidal ideations    Panic attacks     Elevated Blood Glucose     Opiate dependence  Amphetamine abuse  Cannabis use    Labs:  Admission on 10/06/2020   Component Date Value Ref Range Status    Hemoglobin A1C 10/07/2020 5.4  4.0 - 5.6 % Final    Estimated Avg Glucose 10/07/2020 108  68 - 131 mg/dL Final    Cholesterol 10/07/2020 120  120 - 199 mg/dL Final    Triglycerides 10/07/2020 65  30 - 150 mg/dL Final    HDL 10/07/2020 50  40 - 75 mg/dL Final    LDL Cholesterol 10/07/2020 57.0* 63.0 - 159.0 mg/dL Final    Hdl/Cholesterol Ratio 10/07/2020 41.7  20.0 - 50.0 % Final    Total Cholesterol/HDL Ratio 10/07/2020 2.4  2.0 - 5.0 Final    Non-HDL Cholesterol 10/07/2020 70  mg/dL Final   Admission on 10/06/2020, Discharged on 10/06/2020   Component Date Value Ref Range Status    WBC 10/06/2020 9.10  3.90 - 12.70  K/uL Final    RBC 10/06/2020 4.79  4.60 - 6.20 M/uL Final    Hemoglobin 10/06/2020 14.7  14.0 - 18.0 g/dL Final    Hematocrit 10/06/2020 42.6  40.0 - 54.0 % Final    Mean Corpuscular Volume 10/06/2020 89  82 - 98 fL Final    Mean Corpuscular Hemoglobin 10/06/2020 30.7  27.0 - 31.0 pg Final    Mean Corpuscular Hemoglobin Conc 10/06/2020 34.5  32.0 - 36.0 g/dL Final    RDW 10/06/2020 11.8  11.5 - 14.5 % Final    Platelets 10/06/2020 256  150 - 350 K/uL Final    MPV 10/06/2020 9.2  9.2 - 12.9 fL Final    Immature Granulocytes 10/06/2020 0.2  0.0 - 0.5 % Final    Gran # (ANC) 10/06/2020 5.6  1.8 - 7.7 K/uL Final    Immature Grans (Abs) 10/06/2020 0.02  0.00 - 0.04 K/uL Final    Lymph # 10/06/2020 2.5  1.0 - 4.8 K/uL Final    Mono # 10/06/2020 0.8  0.3 - 1.0 K/uL Final    Eos # 10/06/2020 0.2  0.0 - 0.5 K/uL Final    Baso # 10/06/2020 0.04  0.00 - 0.20 K/uL Final    nRBC 10/06/2020 0  0 /100 WBC Final    Gran% 10/06/2020 62.0  38.0 - 73.0 % Final    Lymph% 10/06/2020 27.0  18.0 - 48.0 % Final    Mono% 10/06/2020 8.8  4.0 - 15.0 % Final    Eosinophil% 10/06/2020 1.6  0.0 - 8.0 % Final    Basophil% 10/06/2020 0.4  0.0 - 1.9 % Final    Differential Method 10/06/2020 Automated   Final    Sodium 10/06/2020 139  136 - 145 mmol/L Final    Potassium 10/06/2020 3.6  3.5 - 5.1 mmol/L Final    Chloride 10/06/2020 101  95 - 110 mmol/L Final    CO2 10/06/2020 25  23 - 29 mmol/L Final    Glucose 10/06/2020 147* 70 - 110 mg/dL Final    BUN, Bld 10/06/2020 20  6 - 20 mg/dL Final    Creatinine 10/06/2020 1.0  0.5 - 1.4 mg/dL Final    Calcium 10/06/2020 9.4  8.7 - 10.5 mg/dL Final    Total Protein 10/06/2020 7.4  6.0 - 8.4 g/dL Final    Albumin 10/06/2020 4.8  3.5 - 5.2 g/dL Final    Total Bilirubin 10/06/2020 0.7  0.1 - 1.0 mg/dL Final    Alkaline Phosphatase 10/06/2020 59  55 - 135 U/L Final    AST 10/06/2020 20  10 - 40 U/L Final    ALT 10/06/2020 17  10 - 44 U/L Final    Anion Gap 10/06/2020 13  8  - 16 mmol/L Final    eGFR if African American 10/06/2020 >60.0  >60 mL/min/1.73 m^2 Final    eGFR if non African American 10/06/2020 >60.0  >60 mL/min/1.73 m^2 Final    Specimen UA 10/06/2020 Urine, Clean Catch   Final    Color, UA 10/06/2020 Yellow  Yellow, Straw, Ivanna Final    Appearance, UA 10/06/2020 Clear  Clear Final    pH, UA 10/06/2020 6.0  5.0 - 8.0 Final    Specific [place], UA 10/06/2020 1.025  1.005 - 1.030 Final    Protein, UA 10/06/2020 1+* Negative Final    Glucose, UA 10/06/2020 Negative  Negative Final    Ketones, UA 10/06/2020 Negative  Negative Final    Bilirubin (UA) 10/06/2020 Negative  Negative Final    Occult Blood UA 10/06/2020 Negative  Negative Final    Nitrite, UA 10/06/2020 Negative  Negative Final    Urobilinogen, UA 10/06/2020 2.0-3.0* Negative EU/dL Final    Leukocytes, UA 10/06/2020 Negative  Negative Final    Benzodiazepines 10/06/2020 Negative   Final    Cocaine (Metab.) 10/06/2020 Negative   Final    Opiate Scrn, Ur 10/06/2020 Presumptive Positive   Final    Barbiturate Screen, Ur 10/06/2020 Negative   Final    Amphetamine Screen, Ur 10/06/2020 Presumptive Positive   Final    THC 10/06/2020 Presumptive Positive   Final    Phencyclidine 10/06/2020 Negative   Final    Creatinine, Random Ur 10/06/2020 242.0  23.0 - 375.0 mg/dL Final    Toxicology Information 10/06/2020 SEE COMMENT   Final    Alcohol, Medical, Serum 10/06/2020 <5  <10 mg/dL Final    Acetaminophen (Tylenol), Serum 10/06/2020 <10.0  10.0 - 20.0 ug/mL Final    SARS-CoV-2 RNA, Amplification, Qual 10/06/2020 Negative  Negative Final    Salicylate Lvl 10/06/2020 <4.0* 15.0 - 30.0 mg/dL Final    RBC, UA 10/06/2020 1  0 - 4 /hpf Final    WBC, UA 10/06/2020 1  0 - 5 /hpf Final    Bacteria 10/06/2020 Negative  None-Occ /hpf Final    Squam Epithel, UA 10/06/2020 1  /hpf Final    Hyaline Casts, UA 10/06/2020 12* 0-1/lpf /lpf Final    Microscopic Comment 10/06/2020 SEE COMMENT   Final          Discharged Condition: stable and improved; not currently a danger to self/others or gravely disabled    Disposition: Home or Self Care    Is patient being discharged on multiple neuroleptics? No    Follow Up/Patient Instructions:     Medications:  Reconciled Home Medications:      Medication List      START taking these medications    escitalopram oxalate 10 MG tablet  Commonly known as: LEXAPRO  Take 1 tablet (10 mg total) by mouth once daily.  Start taking on: October 10, 2020     nicotine 14 mg/24 hr  Commonly known as: NICODERM CQ  Place 1 patch onto the skin once daily.        STOP taking these medications    diclofenac 50 MG EC tablet  Commonly known as: VOLTAREN     NYQUIL ORAL     ondansetron 4 MG Tbdl  Commonly known as: ZOFRAN-ODT     sucralfate 1 gram tablet  Commonly known as: CARAFATE     urea 45 % Crea          No discharge procedures on file.        Diet: regular     Activity as tolerated    Total time spent discharging patient: 34 minutes    Harvey Mcgee III, MD  Psychiatry

## 2020-10-09 NOTE — NURSING
Patient discharged from [facility]. Discharge instructions and patient's safety plan reviewed. Personal items from inventory returned to patient. Reviewed AVS and information.  Patient will be following up with [facility] [place] [address]; phone [phone]. Patient was instructed by [facility] staff to call on 10/09/2020 for 45 minute assessment once discharged and they would schedule him an appointment at that time. Patient will receive a tobacco cessation appointment since is a smoker and substance abuse therapy appointment at mentioned appointment due to UDS+.  AVS was faxed 10/09/2020 at 1340.

## 2020-10-09 NOTE — PLAN OF CARE
"Behavioral Health Unit  Psychosocial History and Assessment  Progress Note      Patient Name: Jabier Haider Jr. YOB: 1990 SW: Carleen EnnisVICTORIAW Date: 10/9/2020    Chief Complaint: addictive disorder, depression, and suicidal ideation    Consent:     Did the patient consent for an interview with the ? Chart review completed.     Did the patient consent for the  to contact family/friend/caregiver?   Pending patient consent.     Did the patient give consent for the  to inform family/friend/caregiver of his/her whereabouts or to discuss discharge planning? Pending patient consent.    Source of Information: Chart review    Is information obtained from interviews considered reliable?   yes    Reason for Admission:     Active Hospital Problems    Diagnosis  POA    Polysubstance abuse [F19.10]  Yes    Elevated glucose [R73.09]  Yes    BETSY (generalized anxiety disorder) [F41.1]  Yes    Depression with suicidal ideation [F32.9, R45.851]  Not Applicable      Resolved Hospital Problems   No resolved problems to display.       History of Present Illness - (Patient Perception):   Pt is a 30 y/o male endorsing increased depression and suicidal ideations related to marital discord and financial stressors. Pt states his fiance wanted more time to herself to work on things and he interpreted it to mean she wanted to leave the relationship. Pt says he and his fiance  a couple days before and he states he grew increasingly depressed, anxious and overwhelmed. Pt states he is also experiencing increased anxiety.   Pt endorses using marijuana but no other drugs despite his UTOX being positive for opiates/methamphetamines and marijuana. Pt also states he was taking Adderall to "stay awake{" since his wife was not home. Purely speculative but patient endorses taking "pain pills" and these may have been from when he was treated for a painful foot condition.   Pt " "endorses one prior psychiatric admission as a teen at [facility] in [place]. Pt states his mother sent him because he was getting into trouble at school and fighting.     History of Present Illness - (Perception of Others):     Per Dr. Sequeira:  Patient states "wife and I were arguing on the phone, we both said things we didn't mean, she took me serious... she took me way serious, I was aggravated and mad." Patient states he was "standing on chair because I didn't know who was there and I was trying to see through the blinds, when I jumped off the chair, they said I was trying to hang myself." Reports he had "made a slip knot, like I would on the barge," out of a dog leash... when asked why, he says "no reason at all sir." Reports arguments with wife are regarding, "I don't know.. I never know what it's over." Reports feeling depressed, "it comes and goes... for a couple months." Reports anxiety "keeps me up," for "a good long time," over 6 months. Reports frequent panic attacks, "every other week." Reports using "Pain pills more often than not," marijuana "every day," since 13 years of age, and took adderall "to help me stay awake, I hadn't been sleeping since my wife wasn't there." He states he is hopeful he and his wife will repair their relationship, "we always do."        Symptoms of Depression: diminished mood or loss of interest/anhedonia - Yes; diminished energy - No, change in sleep - No, change in appetite - Yes, diminished concentration or cognition or indecisiveness - No, PMA/R -  No, excessive guilt or hopelessness or worthlessness - No, suicidal ideations - Yes     Changes in Sleep: trouble with initiation- Yes, maintenance, - No early morning awakening with inability to return to sleep - No, hypersomnolence - No     Suicidal- active/passive ideations - Yes, organized plans, future intentions - No     Homicidal ideations: active/passive ideations - No, organized plans, future intentions - No   "   Symptoms of psychosis: hallucinations - No, delusions - No, disorganized thinking - No, disorganized behavior or abnormal motor behavior - No, or negative symptoms (diminshed emotional expression, avolition, anhedonia, alogia, asociality) - No      Symptoms of shaista or hypomania: elevated, expansive, or irritable mood with increased energy or activity - No; with inflated self-esteem or grandiosity - No, decreased need for sleep - No, increased rate of speech - No, FOI or racing thoughts - No, distractibility - No, increased goal directed activity or PMA - No, risky/disinhibited behavior - No     Symptoms of BETSY: excessive anxiety/worry/fear, more days than not, about numerous issues - Yes, difficult to control - Yes, with restlessness - Yes, fatigue - No, poor concentration - Yes, irritability - No, muscle tension - No, sleep disturbance - Yes; causes functionally impairing distress - No     Symptoms of Panic Disorder: recurrent panic attacks (palpitations/heart racing, sweating, shakiness, dyspnea, choking, chest pain/discomfort, Gi symptoms, dizzy/lightheadedness, hot/col flashes, paresthesias, derealization, fear of losing control or fear of dying) - Yes, precipitated - Yes, un-precipitated - Yes, source of worry and/or behavioral changes secondary - No, agoraphobia - No     Symptoms of PTSD: h/o trauma - No; re-experiencing/intrusive symptoms - No, avoidant behavior - No, negative alterations in cognition or mood - No, hyperarousal symptoms - No; with dissociative symptoms - No     Symptoms of OCD: obsessions (recurrent thoughts/urges/images; intrusive and/or unwanted; uses other thoughts/actions to suppress) - No; compulsions (repetitive behaviors used to lower distress/anxiety/obsessions) - No     Symptoms of Eating Disorders: anorexia - No, bulimia - No or binging- No        Substance/s:  Taken in larger amounts or over longer periods than intended: denies  Persistent desire or unsuccessful attempts to cut  down or stop: denies  Great deal of time spent seeking, using or recovering from: denies  Craving or strong desire to use: denies  Recurrent use despite failure to meet major role obligation: denies  Continued use despite persistent or recurrent social/interparsonal issues due to use: denies  Important social/work/recreational activities given up due to use: denies  Recurrent use in physically hazardous situations: denies  Continued use despite knowledge of persistent physical or psychological problem: denies  Tolerance (either increased need or diminished effect): denies.     Present biopsychosocial functioning: Pt ambulates independently. Family relationships intact. Works in construction.    Past biopsychosocial functioning: Same as above..    Family and Marital/Relationship History:     Significant Other/Partner Relationships:  Pt currently engaged and shares two children with his fiance. They have 2 children together and have been together for 11 years. They are currently /apart for now as she is working on some of her issues. Pt states the children are with his mother, Ashley [place]antridge [phone]    Family Relationships: Intact      Childhood History:     Where was [place] raised? [place]    Who raised the patient? Biological parents.       How does patient describe their childhood? All developmental milestones met.       Who is patient's primary support person? Mom - ashley [facility] [phone]      Culture and Muslim:     Muslim: No Muslim    How strong of a role does Sikh and spirituality play in patient's life? Not applicable.     Adventist or spiritual concerns regarding treatment: not applicable     History of Abuse:   History of Abuse: Denies      Outcome: not applicable.    Psychiatric and Medical History:     History of psychiatric illness or treatment: prior inpatient treatment   Pt was hospitalized at [facility] 14 years ago when a minor.     Medical history:   Past  Medical History:   Diagnosis Date    Addiction to drug     Depression     Psychiatric problem        Substance Abuse History:     Alcohol - (Patient Perspective):   Social History     Substance and Sexual Activity   Alcohol Use No     Alcohol - (Collateral Perspective): Pending patient consent.     Drugs - (Patient Perspective):   Social History     Substance and Sexual Activity   Drug Use Yes    Types: Marijuana       Drugs - (Collateral Perspective): Pending patient consent for collateral.     Additional Comments: none.     Education:     Currently Enrolled? No  Attended College/Technical School    Special Education? 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     Source of Income: unemployment    Able to afford basic needs (food, shelter, utilities)? Financial strain.    Who manages finances/personal affairs? Self.      Service:     [place]? no    Combat Service? No     Community Resources:     Describe present use of community resources: none.      Identify previously used community resources   (Include previous mental health treatment - outpatient and inpatient): none      Environmental:     Current living situation:Lives with family    Social Evaluation:     Patient Assets: General fund of knowledge, Average or above intelligence, Supportive family/friends, Motivation for treatment/growth, Capable of independent living, Work skills, Physical health, Active sense of humor, Ability for insight and Communicable skills    Patient Limitations: substance use.     High risk psychosocial issues that may impact discharge planning:   None identified.     Recommendations:     Anticipated discharge plan:   Recommend patient consider residential substance use treatment. Also refer patient to outpatient follow up for mental health.     High risk issues requiring early treatment planning and immediate intervention:  none.    Community resources needed for discharge planning:  None.    Anticipated social work role(s) in treatment and discharge planning: LMSW to refer patient to outpatient mental health and/or substance use treatment.

## 2021-01-15 ENCOUNTER — HOSPITAL ENCOUNTER (EMERGENCY)
Facility: HOSPITAL | Age: 31
Discharge: HOME | End: 2021-01-15
Attending: EMERGENCY MEDICINE

## 2021-01-15 VITALS
OXYGEN SATURATION: 98 % | WEIGHT: 145 LBS | BODY MASS INDEX: 21.98 KG/M2 | SYSTOLIC BLOOD PRESSURE: 136 MMHG | HEART RATE: 104 BPM | TEMPERATURE: 98 F | HEIGHT: 68 IN | DIASTOLIC BLOOD PRESSURE: 83 MMHG | RESPIRATION RATE: 18 BRPM

## 2021-01-15 DIAGNOSIS — M17.11 ARTHRITIS OF RIGHT KNEE: Primary | ICD-10-CM

## 2021-01-15 DIAGNOSIS — R52 PAIN: ICD-10-CM

## 2021-01-15 PROCEDURE — 99283 EMERGENCY DEPT VISIT LOW MDM: CPT | Mod: 25

## 2021-01-15 RX ORDER — IBUPROFEN 800 MG/1
800 TABLET ORAL EVERY 6 HOURS PRN
Qty: 30 TABLET | Refills: 0 | Status: ON HOLD | OUTPATIENT
Start: 2021-01-15 | End: 2022-05-09 | Stop reason: HOSPADM

## 2021-01-15 RX ORDER — IBUPROFEN 400 MG/1
800 TABLET ORAL
Status: DISCONTINUED | OUTPATIENT
Start: 2021-01-15 | End: 2021-01-15 | Stop reason: HOSPADM

## 2021-05-06 ENCOUNTER — PATIENT MESSAGE (OUTPATIENT)
Dept: RESEARCH | Facility: HOSPITAL | Age: 31
End: 2021-05-06

## 2022-04-14 ENCOUNTER — HOSPITAL ENCOUNTER (EMERGENCY)
Facility: HOSPITAL | Age: 32
Discharge: PSYCHIATRIC OR SUBSTANCE USE FACILITY | End: 2022-04-15
Attending: EMERGENCY MEDICINE
Payer: MEDICAID

## 2022-04-14 DIAGNOSIS — R44.3 HALLUCINATIONS: ICD-10-CM

## 2022-04-14 DIAGNOSIS — F23 ACUTE PSYCHOSIS: ICD-10-CM

## 2022-04-14 DIAGNOSIS — Z00.8 MEDICAL CLEARANCE FOR PSYCHIATRIC ADMISSION: Primary | ICD-10-CM

## 2022-04-14 LAB
ALBUMIN SERPL BCP-MCNC: 5.2 G/DL (ref 3.5–5.2)
ALP SERPL-CCNC: 76 U/L (ref 55–135)
ALT SERPL W/O P-5'-P-CCNC: 18 U/L (ref 10–44)
ANION GAP SERPL CALC-SCNC: 11 MMOL/L (ref 8–16)
APAP SERPL-MCNC: <10 UG/ML (ref 10–20)
AST SERPL-CCNC: 18 U/L (ref 10–40)
BACTERIA #/AREA URNS HPF: NEGATIVE /HPF
BASOPHILS # BLD AUTO: 0.02 K/UL (ref 0–0.2)
BASOPHILS NFR BLD: 0.2 % (ref 0–1.9)
BILIRUB SERPL-MCNC: 0.7 MG/DL (ref 0.1–1)
BILIRUB UR QL STRIP: NEGATIVE
BUN SERPL-MCNC: 22 MG/DL (ref 6–20)
CALCIUM SERPL-MCNC: 9.6 MG/DL (ref 8.7–10.5)
CHLORIDE SERPL-SCNC: 100 MMOL/L (ref 95–110)
CLARITY UR: CLEAR
CO2 SERPL-SCNC: 28 MMOL/L (ref 23–29)
COLOR UR: YELLOW
CREAT SERPL-MCNC: 1.1 MG/DL (ref 0.5–1.4)
DIFFERENTIAL METHOD: ABNORMAL
EOSINOPHIL # BLD AUTO: 0.1 K/UL (ref 0–0.5)
EOSINOPHIL NFR BLD: 0.5 % (ref 0–8)
ERYTHROCYTE [DISTWIDTH] IN BLOOD BY AUTOMATED COUNT: 12.2 % (ref 11.5–14.5)
EST. GFR  (AFRICAN AMERICAN): >60 ML/MIN/1.73 M^2
EST. GFR  (NON AFRICAN AMERICAN): >60 ML/MIN/1.73 M^2
ETHANOL SERPL-MCNC: 5 MG/DL
GLUCOSE SERPL-MCNC: 94 MG/DL (ref 70–110)
GLUCOSE UR QL STRIP: NEGATIVE
HCT VFR BLD AUTO: 46.4 % (ref 40–54)
HGB BLD-MCNC: 16 G/DL (ref 14–18)
HGB UR QL STRIP: NEGATIVE
HYALINE CASTS #/AREA URNS LPF: 81 /LPF
IMM GRANULOCYTES # BLD AUTO: 0.02 K/UL (ref 0–0.04)
IMM GRANULOCYTES NFR BLD AUTO: 0.2 % (ref 0–0.5)
KETONES UR QL STRIP: ABNORMAL
LEUKOCYTE ESTERASE UR QL STRIP: NEGATIVE
LYMPHOCYTES # BLD AUTO: 2.7 K/UL (ref 1–4.8)
LYMPHOCYTES NFR BLD: 27.2 % (ref 18–48)
MCH RBC QN AUTO: 30.1 PG (ref 27–31)
MCHC RBC AUTO-ENTMCNC: 34.5 G/DL (ref 32–36)
MCV RBC AUTO: 87 FL (ref 82–98)
MICROSCOPIC COMMENT: ABNORMAL
MONOCYTES # BLD AUTO: 1 K/UL (ref 0.3–1)
MONOCYTES NFR BLD: 9.9 % (ref 4–15)
NEUTROPHILS # BLD AUTO: 6.2 K/UL (ref 1.8–7.7)
NEUTROPHILS NFR BLD: 62 % (ref 38–73)
NITRITE UR QL STRIP: NEGATIVE
NRBC BLD-RTO: 0 /100 WBC
PH UR STRIP: 6 [PH] (ref 5–8)
PLATELET # BLD AUTO: 297 K/UL (ref 150–450)
PMV BLD AUTO: 8.6 FL (ref 9.2–12.9)
POTASSIUM SERPL-SCNC: 4.2 MMOL/L (ref 3.5–5.1)
PROT SERPL-MCNC: 8.3 G/DL (ref 6–8.4)
PROT UR QL STRIP: ABNORMAL
RBC # BLD AUTO: 5.31 M/UL (ref 4.6–6.2)
RBC #/AREA URNS HPF: 1 /HPF (ref 0–4)
SALICYLATES SERPL-MCNC: <4 MG/DL (ref 15–30)
SARS-COV-2 RDRP RESP QL NAA+PROBE: NEGATIVE
SODIUM SERPL-SCNC: 139 MMOL/L (ref 136–145)
SP GR UR STRIP: >1.03 (ref 1–1.03)
SQUAMOUS #/AREA URNS HPF: 5 /HPF
TSH SERPL DL<=0.005 MIU/L-ACNC: 1.22 UIU/ML (ref 0.34–5.6)
URN SPEC COLLECT METH UR: ABNORMAL
UROBILINOGEN UR STRIP-ACNC: ABNORMAL EU/DL
WBC # BLD AUTO: 10.02 K/UL (ref 3.9–12.7)
WBC #/AREA URNS HPF: 2 /HPF (ref 0–5)

## 2022-04-14 PROCEDURE — 80143 DRUG ASSAY ACETAMINOPHEN: CPT | Performed by: EMERGENCY MEDICINE

## 2022-04-14 PROCEDURE — 84443 ASSAY THYROID STIM HORMONE: CPT | Performed by: EMERGENCY MEDICINE

## 2022-04-14 PROCEDURE — 99285 EMERGENCY DEPT VISIT HI MDM: CPT

## 2022-04-14 PROCEDURE — 80053 COMPREHEN METABOLIC PANEL: CPT | Performed by: EMERGENCY MEDICINE

## 2022-04-14 PROCEDURE — 99215 PR OFFICE/OUTPT VISIT, EST, LEVL V, 40-54 MIN: ICD-10-PCS | Mod: 95,HB,AF, | Performed by: PSYCHIATRY & NEUROLOGY

## 2022-04-14 PROCEDURE — U0002 COVID-19 LAB TEST NON-CDC: HCPCS | Performed by: EMERGENCY MEDICINE

## 2022-04-14 PROCEDURE — 82077 ASSAY SPEC XCP UR&BREATH IA: CPT | Performed by: EMERGENCY MEDICINE

## 2022-04-14 PROCEDURE — 80179 DRUG ASSAY SALICYLATE: CPT | Performed by: EMERGENCY MEDICINE

## 2022-04-14 PROCEDURE — 81001 URINALYSIS AUTO W/SCOPE: CPT | Performed by: EMERGENCY MEDICINE

## 2022-04-14 PROCEDURE — 99215 OFFICE O/P EST HI 40 MIN: CPT | Mod: 95,HB,AF, | Performed by: PSYCHIATRY & NEUROLOGY

## 2022-04-14 PROCEDURE — 85025 COMPLETE CBC W/AUTO DIFF WBC: CPT | Performed by: EMERGENCY MEDICINE

## 2022-04-14 PROCEDURE — 80307 DRUG TEST PRSMV CHEM ANLYZR: CPT | Performed by: EMERGENCY MEDICINE

## 2022-04-15 VITALS
OXYGEN SATURATION: 100 % | HEART RATE: 76 BPM | BODY MASS INDEX: 21.98 KG/M2 | DIASTOLIC BLOOD PRESSURE: 64 MMHG | HEIGHT: 68 IN | TEMPERATURE: 98 F | SYSTOLIC BLOOD PRESSURE: 115 MMHG | WEIGHT: 145 LBS | RESPIRATION RATE: 16 BRPM

## 2022-04-15 LAB
AMPHET+METHAMPHET UR QL: ABNORMAL
BARBITURATES UR QL SCN>200 NG/ML: NEGATIVE
BENZODIAZ UR QL SCN>200 NG/ML: NEGATIVE
BZE UR QL SCN: NEGATIVE
CANNABINOIDS UR QL SCN: ABNORMAL
CREAT UR-MCNC: 516 MG/DL (ref 23–375)
OPIATES UR QL SCN: NEGATIVE
PCP UR QL SCN>25 NG/ML: NEGATIVE
TOXICOLOGY INFORMATION: ABNORMAL
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
## 2022-04-16 PROBLEM — R51.9 HEADACHE: Status: ACTIVE | Noted: 2022-04-16

## 2022-04-16 PROBLEM — Z13.9 ENCOUNTER FOR MEDICAL SCREENING EXAMINATION: Status: ACTIVE | Noted: 2022-04-16

## 2022-04-16 PROBLEM — F39 MOOD DISORDER: Status: ACTIVE | Noted: 2022-04-16

## 2022-04-19 PROBLEM — F15.10 METHAMPHETAMINE USE: Status: ACTIVE | Noted: 2022-04-19

## 2022-04-19 PROBLEM — F11.20 UNCOMPLICATED OPIOID DEPENDENCE: Status: ACTIVE | Noted: 2022-04-19

## 2022-04-19 PROBLEM — F32.2 MDD (MAJOR DEPRESSIVE DISORDER), SEVERE: Status: ACTIVE | Noted: 2022-04-19

## 2022-05-04 ENCOUNTER — HOSPITAL ENCOUNTER (EMERGENCY)
Facility: HOSPITAL | Age: 32
End: 2022-05-05
Attending: EMERGENCY MEDICINE
Payer: MEDICAID

## 2022-05-04 DIAGNOSIS — R45.851 SUICIDAL IDEATIONS: ICD-10-CM

## 2022-05-04 DIAGNOSIS — Z00.8 MEDICAL CLEARANCE FOR PSYCHIATRIC ADMISSION: ICD-10-CM

## 2022-05-04 DIAGNOSIS — R45.850 HOMICIDAL IDEATION: ICD-10-CM

## 2022-05-04 DIAGNOSIS — F23 ACUTE PSYCHOSIS: Primary | ICD-10-CM

## 2022-05-04 LAB
ALBUMIN SERPL BCP-MCNC: 5.1 G/DL (ref 3.5–5.2)
ALP SERPL-CCNC: 65 U/L (ref 55–135)
ALT SERPL W/O P-5'-P-CCNC: 14 U/L (ref 10–44)
AMPHET+METHAMPHET UR QL: ABNORMAL
ANION GAP SERPL CALC-SCNC: 13 MMOL/L (ref 8–16)
APAP SERPL-MCNC: <10 UG/ML (ref 10–20)
AST SERPL-CCNC: 15 U/L (ref 10–40)
BARBITURATES UR QL SCN>200 NG/ML: NEGATIVE
BASOPHILS # BLD AUTO: 0.02 K/UL (ref 0–0.2)
BASOPHILS NFR BLD: 0.3 % (ref 0–1.9)
BENZODIAZ UR QL SCN>200 NG/ML: NEGATIVE
BILIRUB SERPL-MCNC: 0.8 MG/DL (ref 0.1–1)
BILIRUB UR QL STRIP: NEGATIVE
BUN SERPL-MCNC: 11 MG/DL (ref 6–20)
BZE UR QL SCN: NEGATIVE
CALCIUM SERPL-MCNC: 9.9 MG/DL (ref 8.7–10.5)
CANNABINOIDS UR QL SCN: ABNORMAL
CHLORIDE SERPL-SCNC: 100 MMOL/L (ref 95–110)
CK SERPL-CCNC: 84 U/L (ref 20–200)
CLARITY UR: ABNORMAL
CO2 SERPL-SCNC: 28 MMOL/L (ref 23–29)
COLOR UR: YELLOW
CREAT SERPL-MCNC: 0.8 MG/DL (ref 0.5–1.4)
CREAT UR-MCNC: 155 MG/DL (ref 23–375)
DIFFERENTIAL METHOD: ABNORMAL
EOSINOPHIL # BLD AUTO: 0.1 K/UL (ref 0–0.5)
EOSINOPHIL NFR BLD: 0.8 % (ref 0–8)
ERYTHROCYTE [DISTWIDTH] IN BLOOD BY AUTOMATED COUNT: 12.1 % (ref 11.5–14.5)
EST. GFR  (AFRICAN AMERICAN): >60 ML/MIN/1.73 M^2
EST. GFR  (NON AFRICAN AMERICAN): >60 ML/MIN/1.73 M^2
ETHANOL SERPL-MCNC: <5 MG/DL
GLUCOSE SERPL-MCNC: 108 MG/DL (ref 70–110)
GLUCOSE UR QL STRIP: NEGATIVE
HCT VFR BLD AUTO: 43.3 % (ref 40–54)
HGB BLD-MCNC: 14.9 G/DL (ref 14–18)
HGB UR QL STRIP: NEGATIVE
IMM GRANULOCYTES # BLD AUTO: 0.01 K/UL (ref 0–0.04)
IMM GRANULOCYTES NFR BLD AUTO: 0.1 % (ref 0–0.5)
KETONES UR QL STRIP: NEGATIVE
LEUKOCYTE ESTERASE UR QL STRIP: NEGATIVE
LYMPHOCYTES # BLD AUTO: 1.8 K/UL (ref 1–4.8)
LYMPHOCYTES NFR BLD: 23.6 % (ref 18–48)
MCH RBC QN AUTO: 30.8 PG (ref 27–31)
MCHC RBC AUTO-ENTMCNC: 34.4 G/DL (ref 32–36)
MCV RBC AUTO: 90 FL (ref 82–98)
MONOCYTES # BLD AUTO: 0.6 K/UL (ref 0.3–1)
MONOCYTES NFR BLD: 8.1 % (ref 4–15)
NEUTROPHILS # BLD AUTO: 5.2 K/UL (ref 1.8–7.7)
NEUTROPHILS NFR BLD: 67.1 % (ref 38–73)
NITRITE UR QL STRIP: NEGATIVE
NRBC BLD-RTO: 0 /100 WBC
OPIATES UR QL SCN: NEGATIVE
PCP UR QL SCN>25 NG/ML: NEGATIVE
PH UR STRIP: 8 [PH] (ref 5–8)
PLATELET # BLD AUTO: 247 K/UL (ref 150–450)
PMV BLD AUTO: 9 FL (ref 9.2–12.9)
POTASSIUM SERPL-SCNC: 3.8 MMOL/L (ref 3.5–5.1)
PROT SERPL-MCNC: 7.5 G/DL (ref 6–8.4)
PROT UR QL STRIP: ABNORMAL
RBC # BLD AUTO: 4.84 M/UL (ref 4.6–6.2)
SALICYLATES SERPL-MCNC: <4 MG/DL (ref 15–30)
SARS-COV-2 RDRP RESP QL NAA+PROBE: NEGATIVE
SODIUM SERPL-SCNC: 141 MMOL/L (ref 136–145)
SP GR UR STRIP: 1.02 (ref 1–1.03)
TOXICOLOGY INFORMATION: ABNORMAL
TSH SERPL DL<=0.005 MIU/L-ACNC: 0.57 UIU/ML (ref 0.34–5.6)
URN SPEC COLLECT METH UR: ABNORMAL
UROBILINOGEN UR STRIP-ACNC: NEGATIVE EU/DL
WBC # BLD AUTO: 7.74 K/UL (ref 3.9–12.7)

## 2022-05-04 PROCEDURE — 84443 ASSAY THYROID STIM HORMONE: CPT | Performed by: EMERGENCY MEDICINE

## 2022-05-04 PROCEDURE — 85025 COMPLETE CBC W/AUTO DIFF WBC: CPT | Performed by: EMERGENCY MEDICINE

## 2022-05-04 PROCEDURE — 80307 DRUG TEST PRSMV CHEM ANLYZR: CPT | Performed by: EMERGENCY MEDICINE

## 2022-05-04 PROCEDURE — 82550 ASSAY OF CK (CPK): CPT | Performed by: EMERGENCY MEDICINE

## 2022-05-04 PROCEDURE — U0002 COVID-19 LAB TEST NON-CDC: HCPCS | Performed by: EMERGENCY MEDICINE

## 2022-05-04 PROCEDURE — 99215 PR OFFICE/OUTPT VISIT, EST, LEVL V, 40-54 MIN: ICD-10-PCS | Mod: 95,AF,HB, | Performed by: PSYCHIATRY & NEUROLOGY

## 2022-05-04 PROCEDURE — 99215 OFFICE O/P EST HI 40 MIN: CPT | Mod: 95,AF,HB, | Performed by: PSYCHIATRY & NEUROLOGY

## 2022-05-04 PROCEDURE — 80143 DRUG ASSAY ACETAMINOPHEN: CPT | Performed by: EMERGENCY MEDICINE

## 2022-05-04 PROCEDURE — 81003 URINALYSIS AUTO W/O SCOPE: CPT | Mod: 59 | Performed by: EMERGENCY MEDICINE

## 2022-05-04 PROCEDURE — 99285 EMERGENCY DEPT VISIT HI MDM: CPT

## 2022-05-04 PROCEDURE — 80053 COMPREHEN METABOLIC PANEL: CPT | Performed by: EMERGENCY MEDICINE

## 2022-05-04 PROCEDURE — 80179 DRUG ASSAY SALICYLATE: CPT | Performed by: EMERGENCY MEDICINE

## 2022-05-04 PROCEDURE — 82077 ASSAY SPEC XCP UR&BREATH IA: CPT | Performed by: EMERGENCY MEDICINE

## 2022-05-04 NOTE — ED NOTES
Pt resting quietly in bed, chest rising and falling, respiration E/U, bed in lowest and locked position, SR ^ x 2, call light in reach. Pt encouraged to call the nurses station for any needs, pt verbalizes understanding.

## 2022-05-05 VITALS
RESPIRATION RATE: 16 BRPM | DIASTOLIC BLOOD PRESSURE: 83 MMHG | HEIGHT: 68 IN | TEMPERATURE: 99 F | OXYGEN SATURATION: 98 % | WEIGHT: 145 LBS | BODY MASS INDEX: 21.98 KG/M2 | SYSTOLIC BLOOD PRESSURE: 133 MMHG | HEART RATE: 76 BPM

## 2022-05-05 PROBLEM — F15.20 METHAMPHETAMINE USE DISORDER, SEVERE: Status: ACTIVE | Noted: 2022-04-19
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
## 2022-05-05 NOTE — ED NOTES
Pt denies suicidal or homicidal thoughts along with auditory or verbal hallucinations. Does states that he attempted to harm himself years ago, but states he did not try very hard. Pt would not say how he made attempt.
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
## 2022-05-06 PROBLEM — T07.XXXA MULTIPLE ABRASIONS: Status: ACTIVE | Noted: 2022-05-06

## 2022-07-18 PROBLEM — Z13.9 ENCOUNTER FOR MEDICAL SCREENING EXAMINATION: Status: RESOLVED | Noted: 2022-04-16 | Resolved: 2022-07-18

## 2022-11-12 ENCOUNTER — HOSPITAL ENCOUNTER (EMERGENCY)
Facility: HOSPITAL | Age: 32
Discharge: HOME | End: 2022-11-12
Attending: EMERGENCY MEDICINE
Payer: MEDICAID

## 2022-11-12 VITALS
HEART RATE: 110 BPM | TEMPERATURE: 98 F | OXYGEN SATURATION: 96 % | RESPIRATION RATE: 20 BRPM | BODY MASS INDEX: 22.71 KG/M2 | DIASTOLIC BLOOD PRESSURE: 89 MMHG | SYSTOLIC BLOOD PRESSURE: 141 MMHG | WEIGHT: 145 LBS

## 2022-11-12 DIAGNOSIS — T50.901A OVERDOSE: ICD-10-CM

## 2022-11-12 DIAGNOSIS — T50.901A DRUG OVERDOSE: Primary | ICD-10-CM

## 2022-11-12 PROCEDURE — 99284 EMERGENCY DEPT VISIT MOD MDM: CPT | Mod: 25

## 2022-11-12 PROCEDURE — 93005 ELECTROCARDIOGRAM TRACING: CPT | Performed by: SPECIALIST

## 2022-11-12 PROCEDURE — 93010 ELECTROCARDIOGRAM REPORT: CPT | Mod: ,,, | Performed by: SPECIALIST

## 2022-11-12 PROCEDURE — 25000003 PHARM REV CODE 250: Performed by: EMERGENCY MEDICINE

## 2022-11-12 PROCEDURE — 93010 EKG 12-LEAD: ICD-10-PCS | Mod: ,,, | Performed by: SPECIALIST

## 2022-11-12 RX ORDER — ACETAMINOPHEN 500 MG
1000 TABLET ORAL
Status: COMPLETED | OUTPATIENT
Start: 2022-11-12 | End: 2022-11-12

## 2022-11-12 RX ORDER — NALOXONE HYDROCHLORIDE 1 MG/ML
INJECTION INTRAMUSCULAR; INTRAVENOUS; SUBCUTANEOUS
Qty: 2 ML | Refills: 11 | Status: SHIPPED | OUTPATIENT
Start: 2022-11-12

## 2022-11-12 RX ADMIN — ACETAMINOPHEN 1000 MG: 500 TABLET, FILM COATED ORAL at 04:11
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
## 2024-02-02 NOTE — PLAN OF CARE
Plan of care reviewed.  Denies intent to harm self or others at this time.  Accepts snacks and medications but on his own time.  Gait steady, no falls.  Barely interacting with staff or peers, remained mostly in bed with blanket pulled over head.  Promoted an individualized safety plan, reality-based interactions, effective coping strategies, and impulse control.  Will continue to monitor for safety.         no

## 2025-03-18 ENCOUNTER — HOSPITAL ENCOUNTER (EMERGENCY)
Facility: HOSPITAL | Age: 35
Discharge: PSYCHIATRIC OR SUBSTANCE USE FACILITY | End: 2025-03-19
Attending: EMERGENCY MEDICINE
Payer: MEDICAID

## 2025-03-18 DIAGNOSIS — F20.0 PARANOID SCHIZOPHRENIA: ICD-10-CM

## 2025-03-18 DIAGNOSIS — Z00.8 MEDICAL CLEARANCE FOR PSYCHIATRIC ADMISSION: Primary | ICD-10-CM

## 2025-03-18 DIAGNOSIS — F19.10 POLYSUBSTANCE ABUSE: ICD-10-CM

## 2025-03-18 LAB
ALBUMIN SERPL BCP-MCNC: 4.5 G/DL (ref 3.5–5.2)
ALP SERPL-CCNC: 73 U/L (ref 40–150)
ALT SERPL W/O P-5'-P-CCNC: 19 U/L (ref 10–44)
AMPHET+METHAMPHET UR QL: ABNORMAL
ANION GAP SERPL CALC-SCNC: 11 MMOL/L (ref 8–16)
APAP SERPL-MCNC: <3 UG/ML (ref 10–20)
AST SERPL-CCNC: 16 U/L (ref 10–40)
BARBITURATES UR QL SCN>200 NG/ML: NEGATIVE
BASOPHILS # BLD AUTO: 0.04 K/UL (ref 0–0.2)
BASOPHILS NFR BLD: 0.7 % (ref 0–1.9)
BENZODIAZ UR QL SCN>200 NG/ML: NEGATIVE
BILIRUB SERPL-MCNC: 0.6 MG/DL (ref 0.1–1)
BILIRUB UR QL STRIP: NEGATIVE
BUN SERPL-MCNC: 13 MG/DL (ref 6–20)
BZE UR QL SCN: NEGATIVE
CALCIUM SERPL-MCNC: 8.8 MG/DL (ref 8.7–10.5)
CANNABINOIDS UR QL SCN: NEGATIVE
CHLORIDE SERPL-SCNC: 98 MMOL/L (ref 95–110)
CLARITY UR: CLEAR
CO2 SERPL-SCNC: 27 MMOL/L (ref 23–29)
COLOR UR: YELLOW
CREAT SERPL-MCNC: 0.9 MG/DL (ref 0.5–1.4)
CREAT UR-MCNC: 195.1 MG/DL (ref 23–375)
DIFFERENTIAL METHOD BLD: ABNORMAL
EOSINOPHIL # BLD AUTO: 0.3 K/UL (ref 0–0.5)
EOSINOPHIL NFR BLD: 4.6 % (ref 0–8)
ERYTHROCYTE [DISTWIDTH] IN BLOOD BY AUTOMATED COUNT: 11.9 % (ref 11.5–14.5)
EST. GFR  (NO RACE VARIABLE): >60 ML/MIN/1.73 M^2
ETHANOL SERPL-MCNC: <10 MG/DL
GLUCOSE SERPL-MCNC: 83 MG/DL (ref 70–110)
GLUCOSE UR QL STRIP: ABNORMAL
HCT VFR BLD AUTO: 44.2 % (ref 40–54)
HCV AB SERPL QL IA: NEGATIVE
HGB BLD-MCNC: 15 G/DL (ref 14–18)
HGB UR QL STRIP: NEGATIVE
HIV 1+2 AB+HIV1 P24 AG SERPL QL IA: NEGATIVE
IMM GRANULOCYTES # BLD AUTO: 0.01 K/UL (ref 0–0.04)
IMM GRANULOCYTES NFR BLD AUTO: 0.2 % (ref 0–0.5)
KETONES UR QL STRIP: NEGATIVE
LEUKOCYTE ESTERASE UR QL STRIP: NEGATIVE
LYMPHOCYTES # BLD AUTO: 2.5 K/UL (ref 1–4.8)
LYMPHOCYTES NFR BLD: 40.7 % (ref 18–48)
MCH RBC QN AUTO: 31.4 PG (ref 27–31)
MCHC RBC AUTO-ENTMCNC: 33.9 G/DL (ref 32–36)
MCV RBC AUTO: 93 FL (ref 82–98)
METHADONE UR QL SCN>300 NG/ML: NEGATIVE
MONOCYTES # BLD AUTO: 0.6 K/UL (ref 0.3–1)
MONOCYTES NFR BLD: 9.3 % (ref 4–15)
NEUTROPHILS # BLD AUTO: 2.7 K/UL (ref 1.8–7.7)
NEUTROPHILS NFR BLD: 44.5 % (ref 38–73)
NITRITE UR QL STRIP: NEGATIVE
NRBC BLD-RTO: 0 /100 WBC
OPIATES UR QL SCN: ABNORMAL
PCP UR QL SCN>25 NG/ML: NEGATIVE
PH UR STRIP: 6 [PH] (ref 5–8)
PLATELET # BLD AUTO: 228 K/UL (ref 150–450)
PMV BLD AUTO: 8.7 FL (ref 9.2–12.9)
POTASSIUM SERPL-SCNC: 3.7 MMOL/L (ref 3.5–5.1)
PROT SERPL-MCNC: 7.2 G/DL (ref 6–8.4)
PROT UR QL STRIP: NEGATIVE
RBC # BLD AUTO: 4.78 M/UL (ref 4.6–6.2)
SARS-COV-2 RDRP RESP QL NAA+PROBE: NEGATIVE
SODIUM SERPL-SCNC: 136 MMOL/L (ref 136–145)
SP GR UR STRIP: 1.02 (ref 1–1.03)
TOXICOLOGY INFORMATION: ABNORMAL
URN SPEC COLLECT METH UR: ABNORMAL
UROBILINOGEN UR STRIP-ACNC: ABNORMAL EU/DL
WBC # BLD AUTO: 6.14 K/UL (ref 3.9–12.7)

## 2025-03-18 PROCEDURE — 80053 COMPREHEN METABOLIC PANEL: CPT | Performed by: EMERGENCY MEDICINE

## 2025-03-18 PROCEDURE — 82077 ASSAY SPEC XCP UR&BREATH IA: CPT | Performed by: EMERGENCY MEDICINE

## 2025-03-18 PROCEDURE — 87635 SARS-COV-2 COVID-19 AMP PRB: CPT | Performed by: EMERGENCY MEDICINE

## 2025-03-18 PROCEDURE — 99285 EMERGENCY DEPT VISIT HI MDM: CPT

## 2025-03-18 PROCEDURE — 87389 HIV-1 AG W/HIV-1&-2 AB AG IA: CPT | Performed by: EMERGENCY MEDICINE

## 2025-03-18 PROCEDURE — 80143 DRUG ASSAY ACETAMINOPHEN: CPT | Performed by: EMERGENCY MEDICINE

## 2025-03-18 PROCEDURE — 80307 DRUG TEST PRSMV CHEM ANLYZR: CPT | Performed by: EMERGENCY MEDICINE

## 2025-03-18 PROCEDURE — 81003 URINALYSIS AUTO W/O SCOPE: CPT | Performed by: EMERGENCY MEDICINE

## 2025-03-18 PROCEDURE — 36415 COLL VENOUS BLD VENIPUNCTURE: CPT | Performed by: EMERGENCY MEDICINE

## 2025-03-18 PROCEDURE — 85025 COMPLETE CBC W/AUTO DIFF WBC: CPT | Performed by: EMERGENCY MEDICINE

## 2025-03-18 PROCEDURE — 86803 HEPATITIS C AB TEST: CPT | Performed by: EMERGENCY MEDICINE

## 2025-03-18 NOTE — ED PROVIDER NOTES
Encounter Date: 3/18/2025       History     Chief Complaint   Patient presents with    Psychiatric Evaluation     OPC- Screaming at night.  Stalking his girlfriend.  Hold head hearing ringing in ears.  Seeing things that aren't there.  Talki to people that are not there, very angry, broke, glass.  Concern he may kill himself.   Patient denies SI and HI.       33-year-old male with history of major depressive disorders, polysubstance abuse, methamphetamine use, paranoid schizophrenia.  Has been found to be noncompliant with medications,  Drug addiction, depression.  Previous psychiatric hospitalization at [facility].  Patient emergency department after being OPC by family.  According to family states patient has been screaming all night.  Has been stalking his girlfriend and has been experiencing auditory hallucination.  Has been seen people that are not there.  Has been extremely angry.  Family concerned that he has capable of hurting himself and others.        Review of patient's allergies indicates:   Allergen Reactions    Latex, natural rubber      Past Medical History:   Diagnosis Date    Addiction to drug     Pt had a poasitive UTOX for opiates, amphetamines and marijuana.     Depression     c/o increase depression in the last couple months.     History of psychiatric hospitalization     Hx of inpatient at [facility] in [place], [place] 14 years ago when a minor.     MDD (major depressive disorder), severe 4/19/2022    Methamphetamine use 4/19/2022    Psychiatric problem     Psychosis     Uncomplicated opioid dependence 4/19/2022     Past Surgical History:   Procedure Laterality Date    DENTAL SURGERY      ear      tubes     Family History   Problem Relation Name Age of Onset    No Known Problems Mother Ashley Meliza      Social History[1]  Review of Systems   Constitutional:  Negative for fever.   HENT:  Negative for sore throat.    Respiratory:  Negative for shortness of breath.    Cardiovascular:   Negative for chest pain.   Gastrointestinal:  Negative for nausea and vomiting.   Genitourinary:  Negative for dysuria.   Musculoskeletal:  Negative for back pain.   Skin:  Negative for rash.   Neurological:  Negative for weakness.   Hematological:  Does not bruise/bleed easily.   Psychiatric/Behavioral:  Positive for behavioral problems and sleep disturbance. The patient is nervous/anxious.        Physical Exam     Initial Vitals [03/18/25 1639]   BP Pulse Resp Temp SpO2   130/80 105 18 97.2 °F (36.2 °C) 97 %      MAP       --         Physical Exam    Nursing note and vitals reviewed.  Constitutional: He appears well-developed and well-nourished.   HENT:   Head: Normocephalic and atraumatic.   Nose: Nose normal. Mouth/Throat: Oropharynx is clear and moist.   Eyes: Conjunctivae and EOM are normal. Pupils are equal, round, and reactive to light. No scleral icterus.   Neck: Neck supple.   Normal range of motion.  Cardiovascular:  Normal rate, regular rhythm, normal heart sounds and intact distal pulses.     Exam reveals no gallop and no friction rub.       No murmur heard.  Pulmonary/Chest: No stridor. No respiratory distress.   Course bilateral breath sounds no adventitious sounds   Abdominal: Abdomen is soft. Bowel sounds are normal. He exhibits no mass. There is no abdominal tenderness. There is no rebound and no guarding.   Musculoskeletal:         General: No edema. Normal range of motion.      Cervical back: Normal range of motion and neck supple.     Lymphadenopathy:     He has no cervical adenopathy.   Neurological: He is alert and oriented to person, place, and time. He has normal strength and normal reflexes. No cranial nerve deficit or sensory deficit. GCS score is 15. GCS eye subscore is 4. GCS verbal subscore is 5. GCS motor subscore is 6.   Skin: Skin is warm and dry. Capillary refill takes less than 2 seconds. No rash noted.   Psychiatric:   Flat affect, poor insight, poor judgment.         ED Course    Procedures  Labs Reviewed   CBC W/ AUTO DIFFERENTIAL - Abnormal       Result Value    WBC 6.14      RBC 4.78      Hemoglobin 15.0      Hematocrit 44.2      MCV 93      MCH 31.4 (*)     MCHC 33.9      RDW 11.9      Platelets 228      MPV 8.7 (*)     Immature Granulocytes 0.2      Gran # (ANC) 2.7      Immature Grans (Abs) 0.01      Lymph # 2.5      Mono # 0.6      Eos # 0.3      Baso # 0.04      nRBC 0      Gran % 44.5      Lymph % 40.7      Mono % 9.3      Eosinophil % 4.6      Basophil % 0.7      Differential Method Automated     URINALYSIS, REFLEX TO URINE CULTURE - Abnormal    Specimen UA Urine, Clean Catch      Color, UA Yellow      Appearance, UA Clear      pH, UA 6.0      Specific Gravity, UA 1.025      Protein, UA Negative      Glucose, UA 1+ (*)     Ketones, UA Negative      Bilirubin (UA) Negative      Occult Blood UA Negative      Nitrite, UA Negative      Urobilinogen, UA 2.0-3.0 (*)     Leukocytes, UA Negative      Narrative:     Specimen Source->Urine   DRUG SCREEN PANEL, URINE EMERGENCY - Abnormal    Benzodiazepines Negative      Methadone metabolites Negative      Cocaine (Metab.) Negative      Opiate Scrn, Ur Presumptive Positive (*)     Barbiturate Screen, Ur Negative      Amphetamine Screen, Ur Presumptive Positive (*)     THC Negative      Phencyclidine Negative      Creatinine, Urine 195.1      Toxicology Information SEE COMMENT      Narrative:     Specimen Source->Urine   ACETAMINOPHEN LEVEL - Abnormal    Acetaminophen (Tylenol), Serum <3.0 (*)    HEPATITIS C ANTIBODY    Hepatitis C Ab Negative      Narrative:     Release to patient->Immediate   HIV 1 / 2 ANTIBODY    HIV 1/2 Ag/Ab Negative      Narrative:     Release to patient->Immediate   COMPREHENSIVE METABOLIC PANEL    Sodium 136      Potassium 3.7      Chloride 98      CO2 27      Glucose 83      BUN 13      Creatinine 0.9      Calcium 8.8      Total Protein 7.2      Albumin 4.5      Total Bilirubin 0.6      Alkaline Phosphatase 73       AST 16      ALT 19      eGFR >60      Anion Gap 11     ALCOHOL,MEDICAL (ETHANOL)    Alcohol, Serum <10     SARS-COV-2 RNA AMPLIFICATION, QUAL    SARS-CoV-2 RNA, Amplification, Qual Negative            Imaging Results    None          Medications - No data to display  Medical Decision Making  Amount and/or Complexity of Data Reviewed  Labs: ordered.                  Medically cleared for psychiatry placement: 3/18/2025  6:48 PM       Medical Decision Making:   Initial Assessment:   33-year-old male with history of major depressive disorders, polysubstance abuse, methamphetamine use, paranoid schizophrenia.  Has been found to be noncompliant with medications,  Drug addiction, depression.  Previous psychiatric hospitalization at [facility].  Patient emergency department after being OPC by family.  According to family states patient has been screaming all night.  Has been stalking his girlfriend and has been experiencing auditory hallucination.  Has been seen people that are not there.  Has been extremely angry.  Family concerned that he has capable of hurting himself and others.      Clinical Tests:   Lab Tests: Ordered and Reviewed  Radiological Study: Ordered and Reviewed             Clinical Impression:  Final diagnoses:  [Z00.8] Medical clearance for psychiatric admission (Primary)  [F20.0] Paranoid schizophrenia  [F19.10] Polysubstance abuse          ED Disposition Condition    Transfer to Psych Facility Stable          ED Prescriptions    None       Follow-up Information    None          Kal Cortez MD  03/18/25 0104         [1]   Social History  Tobacco Use    Smoking status: Every Day     Current packs/day: 1.00     Average packs/day: 1 pack/day for 10.0 years (10.0 ttl pk-yrs)     Types: Cigarettes    Smokeless tobacco: Never   Substance Use Topics    Alcohol use: No    Drug use: Yes     Types: Marijuana, Amphetamines, Hydrocodone     Comment: Pt states he smokes marijuana daily since age 13,  "took Adderall to "stay awake since fiance not home.         Kal Cortez MD  03/18/25 1857    "

## 2025-03-18 NOTE — ED NOTES
34 y.o. male to ED under OPC for auditory and visual hallucinations, stalking his girlfriend, tinnitus, and agitation. Patient has a psychiatric history including depression, general anxiety disorder, mood disorder, and poly substance abuse. Patient denies hallucinations, believes we have him confused with his brother. Patient has flight of ideas, easily excitable. Patient denies all medical complaints at this time. Patient awake, alert, and oriented x 4. No apparent distress noted. VS currently stable. Patient assisted onto stretcher and changed into purple gown. Everything removable, taken from the room. Bed placed in low locked position, side rails up x 2, and explanation of wait provided to patient, alarms set and turned on for monitor and pulse ox, awaiting MD evaluation and orders, plan of care in progress.

## 2025-03-19 VITALS
SYSTOLIC BLOOD PRESSURE: 122 MMHG | BODY MASS INDEX: 23.49 KG/M2 | WEIGHT: 155 LBS | OXYGEN SATURATION: 98 % | HEART RATE: 98 BPM | RESPIRATION RATE: 18 BRPM | DIASTOLIC BLOOD PRESSURE: 76 MMHG | HEIGHT: 68 IN | TEMPERATURE: 97 F

## 2025-03-19 PROBLEM — R53.83 FATIGUE: Status: ACTIVE | Noted: 2025-03-19

## 2025-05-14 ENCOUNTER — HOSPITAL ENCOUNTER (EMERGENCY)
Facility: HOSPITAL | Age: 35
Discharge: HOME | End: 2025-05-14
Attending: EMERGENCY MEDICINE
Payer: COMMERCIAL

## 2025-05-14 VITALS
DIASTOLIC BLOOD PRESSURE: 62 MMHG | HEART RATE: 93 BPM | TEMPERATURE: 98 F | HEIGHT: 68 IN | SYSTOLIC BLOOD PRESSURE: 108 MMHG | OXYGEN SATURATION: 95 % | BODY MASS INDEX: 23.49 KG/M2 | WEIGHT: 155 LBS | RESPIRATION RATE: 18 BRPM

## 2025-05-14 DIAGNOSIS — F19.10 DRUG ABUSE: ICD-10-CM

## 2025-05-14 DIAGNOSIS — J40 BRONCHITIS: ICD-10-CM

## 2025-05-14 DIAGNOSIS — R05.9 COUGH: ICD-10-CM

## 2025-05-14 DIAGNOSIS — E86.0 DEHYDRATION: ICD-10-CM

## 2025-05-14 DIAGNOSIS — T50.901A ACCIDENTAL DRUG OVERDOSE, INITIAL ENCOUNTER: Primary | ICD-10-CM

## 2025-05-14 LAB
ABSOLUTE EOSINOPHIL (SMH): 0.17 K/UL
ABSOLUTE MONOCYTE (SMH): 0.81 K/UL (ref 0.3–1)
ABSOLUTE NEUTROPHIL COUNT (SMH): 7.6 K/UL (ref 1.8–7.7)
ALBUMIN SERPL-MCNC: 4.3 G/DL (ref 3.5–5.2)
ALP SERPL-CCNC: 89 UNIT/L (ref 40–150)
ALT SERPL-CCNC: 39 UNIT/L (ref 10–44)
ANION GAP (SMH): 11 MMOL/L (ref 8–16)
APAP SERPL-MCNC: <3 UG/ML (ref 10–20)
AST SERPL-CCNC: 31 UNIT/L (ref 11–45)
BASOPHILS # BLD AUTO: 0.05 K/UL
BASOPHILS NFR BLD AUTO: 0.5 %
BILIRUB SERPL-MCNC: 0.2 MG/DL (ref 0.1–1)
BUN SERPL-MCNC: 12 MG/DL (ref 6–20)
CALCIUM SERPL-MCNC: 9.3 MG/DL (ref 8.7–10.5)
CHLORIDE SERPL-SCNC: 100 MMOL/L (ref 95–110)
CO2 SERPL-SCNC: 30 MMOL/L (ref 23–29)
CREAT SERPL-MCNC: 1.2 MG/DL (ref 0.5–1.4)
ERYTHROCYTE [DISTWIDTH] IN BLOOD BY AUTOMATED COUNT: 12.4 % (ref 11.5–14.5)
ETHANOL SERPL-MCNC: <10 MG/DL
GFR SERPLBLD CREATININE-BSD FMLA CKD-EPI: >60 ML/MIN/1.73/M2
GLUCOSE SERPL-MCNC: 113 MG/DL (ref 70–110)
HCT VFR BLD AUTO: 46.6 % (ref 40–54)
HGB BLD-MCNC: 15.6 GM/DL (ref 14–18)
IMM GRANULOCYTES # BLD AUTO: 0.03 K/UL (ref 0–0.04)
IMM GRANULOCYTES NFR BLD AUTO: 0.3 % (ref 0–0.5)
LYMPHOCYTES # BLD AUTO: 1.65 K/UL (ref 1–4.8)
MAGNESIUM SERPL-MCNC: 2.1 MG/DL (ref 1.6–2.6)
MCH RBC QN AUTO: 31.6 PG (ref 27–31)
MCHC RBC AUTO-ENTMCNC: 33.5 G/DL (ref 32–36)
MCV RBC AUTO: 94 FL (ref 82–98)
NUCLEATED RBC (/100WBC) (SMH): 0 /100 WBC
PLATELET # BLD AUTO: 232 K/UL (ref 150–450)
PMV BLD AUTO: 8.7 FL (ref 9.2–12.9)
POTASSIUM SERPL-SCNC: 4.8 MMOL/L (ref 3.5–5.1)
PROT SERPL-MCNC: 6.9 GM/DL (ref 6–8.4)
RBC # BLD AUTO: 4.94 M/UL (ref 4.6–6.2)
RELATIVE EOSINOPHIL (SMH): 1.6 % (ref 0–8)
RELATIVE LYMPHOCYTE (SMH): 16 % (ref 18–48)
RELATIVE MONOCYTE (SMH): 7.9 % (ref 4–15)
RELATIVE NEUTROPHIL (SMH): 73.7 % (ref 38–73)
SALICYLATES SERPL-MCNC: <5 MG/DL (ref 15–30)
SODIUM SERPL-SCNC: 141 MMOL/L (ref 136–145)
TROPONIN I SERPL HS-MCNC: 6 NG/L
WBC # BLD AUTO: 10.31 K/UL (ref 3.9–12.7)

## 2025-05-14 PROCEDURE — 80143 DRUG ASSAY ACETAMINOPHEN: CPT | Performed by: EMERGENCY MEDICINE

## 2025-05-14 PROCEDURE — 96360 HYDRATION IV INFUSION INIT: CPT

## 2025-05-14 PROCEDURE — 85025 COMPLETE CBC W/AUTO DIFF WBC: CPT | Performed by: EMERGENCY MEDICINE

## 2025-05-14 PROCEDURE — 36415 COLL VENOUS BLD VENIPUNCTURE: CPT | Performed by: EMERGENCY MEDICINE

## 2025-05-14 PROCEDURE — 80179 DRUG ASSAY SALICYLATE: CPT | Performed by: EMERGENCY MEDICINE

## 2025-05-14 PROCEDURE — 82435 ASSAY OF BLOOD CHLORIDE: CPT | Performed by: EMERGENCY MEDICINE

## 2025-05-14 PROCEDURE — 83735 ASSAY OF MAGNESIUM: CPT | Performed by: EMERGENCY MEDICINE

## 2025-05-14 PROCEDURE — 82077 ASSAY SPEC XCP UR&BREATH IA: CPT | Performed by: EMERGENCY MEDICINE

## 2025-05-14 PROCEDURE — 93010 ELECTROCARDIOGRAM REPORT: CPT | Mod: ,,, | Performed by: INTERNAL MEDICINE

## 2025-05-14 PROCEDURE — 25000003 PHARM REV CODE 250: Performed by: EMERGENCY MEDICINE

## 2025-05-14 PROCEDURE — 84484 ASSAY OF TROPONIN QUANT: CPT | Performed by: EMERGENCY MEDICINE

## 2025-05-14 PROCEDURE — 94640 AIRWAY INHALATION TREATMENT: CPT

## 2025-05-14 PROCEDURE — 93005 ELECTROCARDIOGRAM TRACING: CPT

## 2025-05-14 PROCEDURE — 94760 N-INVAS EAR/PLS OXIMETRY 1: CPT

## 2025-05-14 PROCEDURE — 99285 EMERGENCY DEPT VISIT HI MDM: CPT | Mod: 25

## 2025-05-14 PROCEDURE — 25000242 PHARM REV CODE 250 ALT 637 W/ HCPCS: Performed by: EMERGENCY MEDICINE

## 2025-05-14 RX ORDER — ALBUTEROL SULFATE 90 UG/1
1-2 INHALANT RESPIRATORY (INHALATION) EVERY 6 HOURS PRN
Qty: 18 G | Refills: 1 | Status: SHIPPED | OUTPATIENT
Start: 2025-05-14 | End: 2026-05-14

## 2025-05-14 RX ORDER — PREDNISONE 20 MG/1
40 TABLET ORAL DAILY
Qty: 10 TABLET | Refills: 0 | Status: SHIPPED | OUTPATIENT
Start: 2025-05-14 | End: 2025-05-19

## 2025-05-14 RX ORDER — IPRATROPIUM BROMIDE AND ALBUTEROL SULFATE 2.5; .5 MG/3ML; MG/3ML
3 SOLUTION RESPIRATORY (INHALATION)
Status: COMPLETED | OUTPATIENT
Start: 2025-05-14 | End: 2025-05-14

## 2025-05-14 RX ORDER — IPRATROPIUM BROMIDE AND ALBUTEROL SULFATE 2.5; .5 MG/3ML; MG/3ML
3 SOLUTION RESPIRATORY (INHALATION) EVERY 6 HOURS PRN
Qty: 75 ML | Refills: 0 | Status: SHIPPED | OUTPATIENT
Start: 2025-05-14 | End: 2026-05-14

## 2025-05-14 RX ADMIN — SODIUM CHLORIDE 1000 ML: 9 INJECTION, SOLUTION INTRAVENOUS at 03:05

## 2025-05-14 RX ADMIN — IPRATROPIUM BROMIDE AND ALBUTEROL SULFATE 3 ML: .5; 3 SOLUTION RESPIRATORY (INHALATION) at 04:05

## 2025-05-14 NOTE — DISCHARGE INSTRUCTIONS
RETURN TO EMERGENCY DEPARTMENT WITHOUT FAIL, IF YOUR SYMPTOMS WORSEN, IF YOU GET NEW OR DIFFERENT SYMPTOMS, IF YOU ARE UNABLE TO FOLLOW UP AS DIRECTED, OR IF YOU HAVE ANY CONCERNS OR WORRIES.    Take medication as directed.  Follow up with the primary care provider.

## 2025-05-14 NOTE — ED PROVIDER NOTES
Encounter Date: 5/14/2025       History     Chief Complaint   Patient presents with    Drug Overdose     Found unresponsive in vehicle -- hx of overdose. Awake upon EMS arrival.      This is a 34-year-old male past medical history of overdose, depression, psychiatric hospitalization, methamphetamine use, who presents emergency department with drowsiness, possible overdose.  It is reported that the patient fell asleep in his car he states and his mother found him and said he he was not waking up so she called 911 and he said when all the sirens came he woke up.  He did not receive Narcan.  He admits that he snorted some methamphetamine at some point yesterday.  He says that he did not sleep too good last night so he was just sleeping in his car this morning before he was going to go to work.  He denies any complaints currently.  Nursing staff reports when he was sleeping oxygen was 90% so put on 2 L of oxygen.  He denies any chest pain or shortness of breath.  He denies any other drug use or any alcohol use.      Review of patient's allergies indicates:   Allergen Reactions    Latex, natural rubber      Past Medical History:   Diagnosis Date    Addiction to drug     Pt had a poasitive UTOX for opiates, amphetamines and marijuana.     Depression     c/o increase depression in the last couple months.     Encounter for medical screening examination 4/16/2022    History of psychiatric hospitalization     Hx of inpatient at [facility] in [place], [place] 14 years ago when a minor.     MDD (major depressive disorder), severe 4/19/2022    Methamphetamine use 4/19/2022    Psychiatric problem     Psychosis     Uncomplicated opioid dependence 4/19/2022     Past Surgical History:   Procedure Laterality Date    DENTAL SURGERY      ear      tubes     Family History   Problem Relation Name Age of Onset    No Known Problems Mother Ashley Meliza      Social History[1]  Review of Systems   Constitutional:  Positive for fatigue.  Negative for chills and fever.   HENT:  Negative for sore throat.    Respiratory:  Positive for cough. Negative for shortness of breath.    Cardiovascular:  Negative for chest pain and palpitations.   Gastrointestinal:  Negative for abdominal pain, nausea and vomiting.   Genitourinary:  Negative for dysuria.   Musculoskeletal:  Negative for back pain.   Skin:  Negative for rash.   Neurological:  Negative for weakness.   Hematological:  Does not bruise/bleed easily.   All other systems reviewed and are negative.      Physical Exam     Initial Vitals [05/14/25 1215]   BP Pulse Resp Temp SpO2   (!) 121/90 (!) 116 16 97.8 °F (36.6 °C) 95 %      MAP       --         Physical Exam    Nursing note and vitals reviewed.  Constitutional: He appears well-developed and well-nourished. He is not diaphoretic. No distress.   HENT:   Head: Normocephalic and atraumatic. Mouth/Throat: Oropharynx is clear and moist. No oropharyngeal exudate.   Eyes: Conjunctivae and EOM are normal. Pupils are equal, round, and reactive to light.   Neck: No tracheal deviation present.   Cardiovascular:  Regular rhythm, normal heart sounds and intact distal pulses.           No murmur heard.  Tachycardic   Pulmonary/Chest: No stridor. No respiratory distress. He has wheezes (scattered diffusely). He has rhonchi (scattered diffusely). He has no rales.   Abdominal: Abdomen is soft. Bowel sounds are normal. He exhibits no distension. There is no abdominal tenderness. There is no rebound and no guarding.   Musculoskeletal:         General: No tenderness or edema. Normal range of motion.     Neurological: He is alert and oriented to person, place, and time. He has normal strength. No cranial nerve deficit or sensory deficit.   Skin: Skin is warm and dry. Capillary refill takes less than 2 seconds. No rash noted. No erythema. No pallor.   Psychiatric: He has a normal mood and affect. His behavior is normal. Thought content normal.         ED Course    Procedures  Labs Reviewed   COMPREHENSIVE METABOLIC PANEL - Abnormal       Result Value    Sodium 141      Potassium 4.8      Chloride 100      CO2 30 (*)     Glucose 113 (*)     BUN 12      Creatinine 1.2      Calcium 9.3      Protein Total 6.9      Albumin 4.3      Bilirubin Total 0.2      ALP 89      AST 31      ALT 39      Anion Gap 11      eGFR >60     ACETAMINOPHEN LEVEL - Abnormal    Acetaminophen Level <3.0 (*)    SALICYLATE LEVEL - Abnormal    Salicylate Level <5.0 (*)    CBC WITH DIFFERENTIAL - Abnormal    WBC 10.31      RBC 4.94      Hgb 15.6      Hct 46.6      MCV 94      MCH 31.6 (*)     MCHC 33.5      RDW 12.4      Platelet Count 232      MPV 8.7 (*)     Nucleated RBC 0      Neut % 73.7 (*)     Lymph % 16.0 (*)     Mono % 7.9      Eos % 1.6      Basophil % 0.5      Imm Grans % 0.3      Neut # 7.6      Lymph # 1.65      Mono # 0.81      Eos # 0.17      Baso # 0.05      Imm Grans # 0.03     MAGNESIUM - Normal    Magnesium 2.1     TROPONIN I HIGH SENSITIVITY - Normal    Troponin High Sensitive 6     ALCOHOL,MEDICAL (ETHANOL) - Normal    Alcohol, Serum <10     CBC W/ AUTO DIFFERENTIAL    Narrative:     The following orders were created for panel order Complete Blood Count (CBC).  Procedure                               Abnormality         Status                     ---------                               -----------         ------                     CBC with Differential[[id number]]       Abnormal            Final result                 Please view results for these tests on the individual orders.   DRUG SCREEN PANEL, URINE EMERGENCY     EKG Readings: (Independently Interpreted)   Initial Reading: No STEMI. Rhythm: Sinus Tachycardia. Ectopy: No Ectopy.   EKG sinus tachycardia rate of 104.  No ST elevation or depression.  No STEMI.  EKG interpreted independently by me.       Imaging Results              X-Ray Chest 1 View (Final result)  Result time 05/14/25 13:04:21      Final result by Geronimo Umaña  AMARIS Booker MD (05/14/25 13:04:21)                   Impression:      No acute abnormality.      Electronically signed by: Geronimo Umaña MD  Date:    05/14/2025  Time:    13:04               Narrative:    EXAMINATION:  XR CHEST 1 VIEW    CLINICAL HISTORY:  Cough, unspecified    TECHNIQUE:  Single frontal view of the chest was performed.    COMPARISON:  Chest x-ray of November 12, 2022    FINDINGS:  The lungs are clear, with normal appearance of pulmonary vasculature and no pleural effusion or pneumothorax.    The cardiac silhouette is normal in size. The hilar and mediastinal contours are unremarkable.    Bones are intact.                                       Medications   albuterol-ipratropium 2.5 mg-0.5 mg/3 mL nebulizer solution 3 mL (3 mLs Nebulization Given 5/14/25 3584)   sodium chloride 0.9% bolus 1,000 mL 1,000 mL (1,000 mLs Intravenous New Bag 5/14/25 6165)     Medical Decision Making  Differential includes but not limited to electrolyte abnormality, viral illness, pneumonia, pulmonary edema, dehydration,    Emergent evaluation a 34-year-old male presents emergency department possible drug overdose.  It does appear that patient is mildly intoxicated likely on opioids.  He did state that he snorted some methamphetamine so methamphetamine may have had opioids in it as he is slightly drowsy.  Patient has not required any Narcan however. He has Been observed in the ER for almost 5 hours.  Did have some rhonchi on lung exam improved markedly after breathing treatment.  Says he has had bronchitis type symptoms for about a month.  Will treat for bronchitis with breathing treatments and prednisone.  No obvious pneumonia on chest x-ray.  Labs are unremarkable.  Troponin negative, EKG nonischemic.  Heart rate is improved with IV fluids and after breathing treatment.  Tachycardic likely in the setting of dehydration and amphetamines in the drugs that he used.  Doubt PE.  Doubt any myocarditis either.  Patient has  improved in his feeling better after breathing treatment.  Please see ED course for further details regarding the patient's mother's concerns but I do not feel he is gravely disabled, acutely suicidal or homicidal.  His drug use is chronic and it is to get high, not to harm himself.  Patient does not want to go inpatient for drug rehab.  Patient will be discharged for outpatient follow up with PCP and for repeat evaluation.    A dictation software program was used for this note.  Please expect some simple typographical  errors in this note.     I had a detailed discussion with the patient and/or guardian regarding: The historical points, exam findings, and diagnostic results supporting the discharge diagnosis, lab results, pertinent radiology results, and the need for outpatient follow-up, for definitive care with a family practitioner and to return to the emergency department if symptoms worsen or persist or if there are any questions or concerns that arise at home. All questions have been answered in detail. Strict return to Emergency Department precautions have been provided      Amount and/or Complexity of Data Reviewed  External Data Reviewed: labs and notes.  Labs: ordered. Decision-making details documented in ED Course.  Radiology: ordered and independent interpretation performed. Decision-making details documented in ED Course.  ECG/medicine tests: ordered and independent interpretation performed. Decision-making details documented in ED Course.    Risk  OTC drugs.  Prescription drug management.  Decision regarding hospitalization.               ED Course as of 05/14/25 1658   Wed May 14, 2025   1418 Nursing staff informed me of the note which was written at 1:15 p.m..  I reassessed the patient, he does not seem to be gravely disabled, suicidal or homicidal.  He is answering questions appropriately.  He has normal thought process.  He does not seem to be tangential.  He is does not seem to be hallucinating.   He does not want any drug rehab.  Wants workup complete likely plan to discharge patient back home. [JR]   1624 Patient reassess, lung sounds have improved, rhonchi improved and almost gone Plan to discharge home with outpatient follow up. [JR]      ED Course User Index  [JR] Corbin Thomas DO                           Clinical Impression:  Final diagnoses:  [R05.9] Cough  [T50.901A] Accidental drug overdose, initial encounter (Primary)  [E86.0] Dehydration  [F19.10] Drug abuse  [J40] Bronchitis          ED Disposition Condition    Discharge Stable          ED Prescriptions       Medication Sig Dispense Start Date End Date Auth. Provider    albuterol-ipratropium (DUO-NEB) 2.5 mg-0.5 mg/3 mL nebulizer solution Take 3 mLs by nebulization every 6 (six) hours as needed for Wheezing. Rescue 75 mL 5/14/2025 5/14/2026 Corbin Thomas DO    albuterol (PROVENTIL/VENTOLIN HFA) 90 mcg/actuation inhaler Inhale 1-2 puffs into the lungs every 6 (six) hours as needed for Wheezing. Rescue 18 g 5/14/2025 5/14/2026 Corbin Thomas DO    predniSONE (DELTASONE) 20 MG tablet Take 2 tablets (40 mg total) by mouth once daily. for 5 days 10 tablet 5/14/2025 5/19/2025 Corbin Thomas DO          Follow-up Information       Follow up With Specialties Details Why Contact Info Additional Information    Byron Espinoza MD Family Medicine, Internal Medicine In 3 days  [address]  [facility]  [place] [address]  [phone]       [facility] -  Emergency Medicine  If symptoms worsen [address] [place] [address] [facility] [id number] 1st floor               [1]   Social History  Tobacco Use    Smoking status: Every Day     Current packs/day: 1.00     Average packs/day: 1 pack/day for 10.0 years (10.0 ttl pk-yrs)     Types: Cigarettes    Smokeless tobacco: Never   Substance Use Topics    Alcohol use: No    Drug use: Yes     Types: Marijuana, Amphetamines, Hydrocodone     Comment: Pt states he smokes marijuana daily since age 13,  "took Adderall to "stay awake since fiance not home.         Corbin Thomas,   05/14/25 3163    "

## 2025-05-14 NOTE — ED NOTES
Spoke with Ashley (pts mother). Mother found pt in car and states he was hard to wake up. Mother reports pt has a history of Schizophrenia and takes Lexapro and Seroquel, but states pt is only taking Lexapro currently. Pt was inpatient about 6 weeks ago and once released, overdosed in front of mom's house 2 weeks later. Mom reports her concerns and would like patient committed.  notified.

## 2025-05-17 LAB
OHS QRS DURATION: 90 MS
OHS QTC CALCULATION: 438 MS

## 2025-05-30 ENCOUNTER — HOSPITAL ENCOUNTER (EMERGENCY)
Facility: HOSPITAL | Age: 35
Discharge: HOME | End: 2025-05-30
Attending: EMERGENCY MEDICINE
Payer: COMMERCIAL

## 2025-05-30 VITALS
TEMPERATURE: 98 F | WEIGHT: 155.13 LBS | BODY MASS INDEX: 23.51 KG/M2 | OXYGEN SATURATION: 100 % | SYSTOLIC BLOOD PRESSURE: 140 MMHG | HEIGHT: 68 IN | RESPIRATION RATE: 16 BRPM | DIASTOLIC BLOOD PRESSURE: 94 MMHG | HEART RATE: 122 BPM

## 2025-05-30 DIAGNOSIS — F19.10 SUBSTANCE ABUSE: Primary | ICD-10-CM

## 2025-05-30 LAB
ABSOLUTE EOSINOPHIL (SMH): 0.28 K/UL
ABSOLUTE MONOCYTE (SMH): 0.53 K/UL (ref 0.3–1)
ABSOLUTE NEUTROPHIL COUNT (SMH): 10 K/UL (ref 1.8–7.7)
ALBUMIN SERPL-MCNC: 4.6 G/DL (ref 3.5–5.2)
ALP SERPL-CCNC: 85 UNIT/L (ref 40–150)
ALT SERPL-CCNC: 25 UNIT/L (ref 10–44)
AMPHET UR QL SCN: ABNORMAL
ANION GAP (SMH): 10 MMOL/L (ref 8–16)
AST SERPL-CCNC: 24 UNIT/L (ref 11–45)
BACTERIA #/AREA URNS AUTO: NORMAL /HPF
BARBITURATE SCN PRESENT UR: NEGATIVE
BASOPHILS # BLD AUTO: 0.05 K/UL
BASOPHILS NFR BLD AUTO: 0.4 %
BENZODIAZ UR QL SCN: NEGATIVE
BILIRUB SERPL-MCNC: 0.3 MG/DL (ref 0.1–1)
BILIRUB UR QL STRIP.AUTO: NEGATIVE
BUN SERPL-MCNC: 16 MG/DL (ref 6–20)
CALCIUM SERPL-MCNC: 8.9 MG/DL (ref 8.7–10.5)
CANNABINOIDS UR QL SCN: NEGATIVE
CHLORIDE SERPL-SCNC: 106 MMOL/L (ref 95–110)
CLARITY UR: CLEAR
CO2 SERPL-SCNC: 23 MMOL/L (ref 23–29)
COCAINE UR QL SCN: NEGATIVE
COLOR UR AUTO: YELLOW
CREAT SERPL-MCNC: 1.2 MG/DL (ref 0.5–1.4)
CREAT UR-MCNC: 164.7 MG/DL (ref 23–375)
ERYTHROCYTE [DISTWIDTH] IN BLOOD BY AUTOMATED COUNT: 12.1 % (ref 11.5–14.5)
ETHANOL SERPL-MCNC: <10 MG/DL
GFR SERPLBLD CREATININE-BSD FMLA CKD-EPI: >60 ML/MIN/1.73/M2
GLUCOSE SERPL-MCNC: 273 MG/DL (ref 70–110)
GLUCOSE UR QL STRIP: ABNORMAL
HCT VFR BLD AUTO: 46.8 % (ref 40–54)
HGB BLD-MCNC: 15.8 GM/DL (ref 14–18)
HGB UR QL STRIP: NEGATIVE
HYALINE CASTS UR QL AUTO: 1 /LPF (ref 0–1)
IMM GRANULOCYTES # BLD AUTO: 0.05 K/UL (ref 0–0.04)
IMM GRANULOCYTES NFR BLD AUTO: 0.4 % (ref 0–0.5)
KETONES UR QL STRIP: NEGATIVE
LEUKOCYTE ESTERASE UR QL STRIP: NEGATIVE
LYMPHOCYTES # BLD AUTO: 1.5 K/UL (ref 1–4.8)
MAGNESIUM SERPL-MCNC: 2.2 MG/DL (ref 1.6–2.6)
MCH RBC QN AUTO: 31.8 PG (ref 27–31)
MCHC RBC AUTO-ENTMCNC: 33.8 G/DL (ref 32–36)
MCV RBC AUTO: 94 FL (ref 82–98)
METHADONE UR QL SCN: NEGATIVE
MICROSCOPIC COMMENT: NORMAL
NITRITE UR QL STRIP: NEGATIVE
NUCLEATED RBC (/100WBC) (SMH): 0 /100 WBC
OPIATES UR QL SCN: NEGATIVE
PCP UR QL: NEGATIVE
PH UR STRIP: 7 [PH]
PLATELET # BLD AUTO: 344 K/UL (ref 150–450)
PMV BLD AUTO: 8.7 FL (ref 9.2–12.9)
POTASSIUM SERPL-SCNC: 5.1 MMOL/L (ref 3.5–5.1)
PROT SERPL-MCNC: 7 GM/DL (ref 6–8.4)
PROT UR QL STRIP: ABNORMAL
RBC # BLD AUTO: 4.97 M/UL (ref 4.6–6.2)
RBC #/AREA URNS AUTO: 2 /HPF
RELATIVE EOSINOPHIL (SMH): 2.3 % (ref 0–8)
RELATIVE LYMPHOCYTE (SMH): 12.1 % (ref 18–48)
RELATIVE MONOCYTE (SMH): 4.3 % (ref 4–15)
RELATIVE NEUTROPHIL (SMH): 80.5 % (ref 38–73)
SODIUM SERPL-SCNC: 139 MMOL/L (ref 136–145)
SP GR UR STRIP: >=1.03
UROBILINOGEN UR STRIP-ACNC: NEGATIVE EU/DL
WBC # BLD AUTO: 12.44 K/UL (ref 3.9–12.7)
WBC #/AREA URNS AUTO: 2 /HPF

## 2025-05-30 PROCEDURE — 81001 URINALYSIS AUTO W/SCOPE: CPT | Performed by: EMERGENCY MEDICINE

## 2025-05-30 PROCEDURE — 80053 COMPREHEN METABOLIC PANEL: CPT | Performed by: EMERGENCY MEDICINE

## 2025-05-30 PROCEDURE — 25000003 PHARM REV CODE 250: Performed by: EMERGENCY MEDICINE

## 2025-05-30 PROCEDURE — 83735 ASSAY OF MAGNESIUM: CPT | Performed by: EMERGENCY MEDICINE

## 2025-05-30 PROCEDURE — 82077 ASSAY SPEC XCP UR&BREATH IA: CPT | Performed by: EMERGENCY MEDICINE

## 2025-05-30 PROCEDURE — 80307 DRUG TEST PRSMV CHEM ANLYZR: CPT | Performed by: EMERGENCY MEDICINE

## 2025-05-30 PROCEDURE — 85025 COMPLETE CBC W/AUTO DIFF WBC: CPT | Performed by: EMERGENCY MEDICINE

## 2025-05-30 PROCEDURE — 99284 EMERGENCY DEPT VISIT MOD MDM: CPT | Mod: 25

## 2025-05-30 PROCEDURE — 96360 HYDRATION IV INFUSION INIT: CPT

## 2025-05-30 RX ORDER — NALOXONE HYDROCHLORIDE 4 MG/.1ML
SPRAY NASAL
Qty: 1 EACH | Refills: 11 | Status: SHIPPED | OUTPATIENT
Start: 2025-05-30

## 2025-05-30 RX ADMIN — SODIUM CHLORIDE 1000 ML: 9 INJECTION, SOLUTION INTRAVENOUS at 02:05
